# Patient Record
Sex: MALE | Race: WHITE | NOT HISPANIC OR LATINO | Employment: FULL TIME | URBAN - METROPOLITAN AREA
[De-identification: names, ages, dates, MRNs, and addresses within clinical notes are randomized per-mention and may not be internally consistent; named-entity substitution may affect disease eponyms.]

---

## 2020-02-01 ENCOUNTER — HOSPITAL ENCOUNTER (EMERGENCY)
Facility: HOSPITAL | Age: 50
Discharge: HOME | End: 2020-02-01
Attending: EMERGENCY MEDICINE
Payer: COMMERCIAL

## 2020-02-01 VITALS
HEIGHT: 67 IN | TEMPERATURE: 98 F | HEART RATE: 99 BPM | DIASTOLIC BLOOD PRESSURE: 82 MMHG | SYSTOLIC BLOOD PRESSURE: 147 MMHG | WEIGHT: 237.63 LBS | BODY MASS INDEX: 37.3 KG/M2 | OXYGEN SATURATION: 99 % | RESPIRATION RATE: 16 BRPM

## 2020-02-01 DIAGNOSIS — R03.0 ELEVATED BLOOD PRESSURE READING: ICD-10-CM

## 2020-02-01 DIAGNOSIS — N30.90 CYSTITIS: Primary | ICD-10-CM

## 2020-02-01 DIAGNOSIS — R31.9 HEMATURIA, UNSPECIFIED TYPE: ICD-10-CM

## 2020-02-01 LAB
BACTERIA #/AREA URNS HPF: ABNORMAL /HPF
BILIRUB UR QL STRIP: ABNORMAL
CLARITY UR: ABNORMAL
COLOR UR: ABNORMAL
GLUCOSE UR QL STRIP: ABNORMAL
HGB UR QL STRIP: ABNORMAL
KETONES UR QL STRIP: ABNORMAL
LEUKOCYTE ESTERASE UR QL STRIP: ABNORMAL
MICROSCOPIC COMMENT: ABNORMAL
NITRITE UR QL STRIP: ABNORMAL
PH UR STRIP: ABNORMAL [PH] (ref 5–8)
PROT UR QL STRIP: ABNORMAL
RBC #/AREA URNS HPF: >100 /HPF (ref 0–4)
SP GR UR STRIP: ABNORMAL (ref 1–1.03)
URN SPEC COLLECT METH UR: ABNORMAL
UROBILINOGEN UR STRIP-ACNC: ABNORMAL EU/DL
WBC #/AREA URNS HPF: >100 /HPF (ref 0–5)

## 2020-02-01 PROCEDURE — 87077 CULTURE AEROBIC IDENTIFY: CPT

## 2020-02-01 PROCEDURE — 87088 URINE BACTERIA CULTURE: CPT

## 2020-02-01 PROCEDURE — 87186 SC STD MICRODIL/AGAR DIL: CPT

## 2020-02-01 PROCEDURE — 99283 EMERGENCY DEPT VISIT LOW MDM: CPT

## 2020-02-01 PROCEDURE — 87086 URINE CULTURE/COLONY COUNT: CPT

## 2020-02-01 PROCEDURE — 81000 URINALYSIS NONAUTO W/SCOPE: CPT

## 2020-02-01 RX ORDER — SULFAMETHOXAZOLE AND TRIMETHOPRIM 800; 160 MG/1; MG/1
1 TABLET ORAL 2 TIMES DAILY
Qty: 10 TABLET | Refills: 0 | Status: SHIPPED | OUTPATIENT
Start: 2020-02-01 | End: 2020-02-06

## 2020-02-01 RX ORDER — NITROFURANTOIN 25; 75 MG/1; MG/1
100 CAPSULE ORAL
COMMUNITY
End: 2020-02-01

## 2020-02-01 RX ORDER — ASPIRIN 325 MG
325 TABLET ORAL DAILY
COMMUNITY
End: 2022-04-27

## 2020-02-01 NOTE — ED PROVIDER NOTES
SCRIBE #1 NOTE: I, Jose Elias Peguero, am scribing for, and in the presence of, Susan Jacobson PA-C. I have scribed the entire note.       History     Chief Complaint   Patient presents with    Hematuria     currently taking antibiotics for UTI     Review of patient's allergies indicates:   Allergen Reactions    Penicillins          History of Present Illness     HPI    2/1/2020, 4:17 PM   History obtained from the patient      History of Present Illness: Oleksandr Dawkins is a 49 y.o. male patient with a PMHx of heart attack who presents to the Emergency Department for evaluation of hematuria which onset gradually today. Pt has been on macrobid for a UTI for approximately 1 week. Symptoms are constant and moderate in severity. No mitigating or exacerbating factors reported. No associated sxs reported. Patient denies any fever, SOB, CP, abd pain, N/V, dysuria, urinary frequency, back pain, HA, weakness, and all other sxs at this time. No prior Tx reported. No further complaints or concerns at this time.        Arrival mode: Personal vehicle      PCP: Primary Doctor No        Past Medical History:  Past Medical History:   Diagnosis Date    Heart attack        Past Surgical History:  Past Surgical History:   Procedure Laterality Date    CHOLECYSTECTOMY      CORONARY ANGIOPLASTY WITH STENT PLACEMENT      gastric sleeve           Family History:  History reviewed. No pertinent family history.      Social History:  Social History     Tobacco Use    Smoking status: Never Smoker    Smokeless tobacco: Never Used   Substance and Sexual Activity    Alcohol use: Yes     Comment: occ    Drug use: Unknown     Sexual activity: Unknown         Review of Systems     Review of Systems   Constitutional: Negative for fever.   HENT: Negative for sore throat.    Respiratory: Negative for shortness of breath.    Cardiovascular: Negative for chest pain.   Gastrointestinal: Negative for abdominal pain, nausea and vomiting.  "  Genitourinary: Positive for hematuria. Negative for dysuria and frequency.   Musculoskeletal: Negative for back pain.   Skin: Negative for rash.   Neurological: Negative for weakness and headaches.   Hematological: Does not bruise/bleed easily.   All other systems reviewed and are negative.       Physical Exam     Initial Vitals [02/01/20 1603]   BP Pulse Resp Temp SpO2   (!) 147/82 99 16 98.4 °F (36.9 °C) 99 %      MAP       --          Physical Exam  Nursing Notes and Vital Signs Reviewed.  Constitutional: Patient is in no acute distress. Well-developed and well-nourished.  Head: Atraumatic. Normocephalic.  Eyes: PERRL. EOM intact. Conjunctivae are not pale. No scleral icterus.  ENT: Mucous membranes are moist. Oropharynx is clear and symmetric.    Neck: Supple. Full ROM. No lymphadenopathy.  Cardiovascular: Regular rate. Regular rhythm. No murmurs, rubs, or gallops. Distal pulses are 2+ and symmetric.  Pulmonary/Chest: No respiratory distress. Clear to auscultation bilaterally. No wheezing or rales.  Abdominal: Soft and non-distended.  There is no tenderness.  No rebound, guarding, or rigidity. Good bowel sounds.  Genitourinary: No CVA tenderness  Musculoskeletal: Moves all extremities. No obvious deformities. No edema. No calf tenderness.  Skin: Warm and dry.  Neurological:  Alert, awake, and appropriate.  Normal speech.  No acute focal neurological deficits are appreciated.  Psychiatric: Normal affect. Good eye contact. Appropriate in content.     ED Course   Procedures  ED Vital Signs:  Vitals:    02/01/20 1603   BP: (!) 147/82   Pulse: 99   Resp: 16   Temp: 98.4 °F (36.9 °C)   TempSrc: Oral   SpO2: 99%   Weight: 107.8 kg (237 lb 10.5 oz)   Height: 5' 7" (1.702 m)       Abnormal Lab Results:  Labs Reviewed   URINALYSIS, REFLEX TO URINE CULTURE - Abnormal; Notable for the following components:       Result Value    Color, UA Red (*)     Appearance, UA Cloudy (*)     All other components within normal limits "    Narrative:     Preferred Collection Type->Urine, Clean Catch   URINALYSIS MICROSCOPIC - Abnormal; Notable for the following components:    RBC, UA >100 (*)     WBC, UA >100 (*)     Bacteria Few (*)     All other components within normal limits    Narrative:     Preferred Collection Type->Urine, Clean Catch   CULTURE, URINE        All Lab Results:  Results for orders placed or performed during the hospital encounter of 02/01/20   Urinalysis, Reflex to Urine Culture Urine, Clean Catch   Result Value Ref Range    Specimen UA Urine, Clean Catch     Color, UA Red (A) Yellow, Straw, Kristie    Appearance, UA Cloudy (A) Clear    pH, UA SEE COMMENT 5.0 - 8.0    Specific Gravity, UA SEE COMMENT 1.005 - 1.030    Protein, UA SEE COMMENT Negative    Glucose, UA SEE COMMENT Negative    Ketones, UA SEE COMMENT Negative    Bilirubin (UA) SEE COMMENT Negative    Occult Blood UA SEE COMMENT Negative    Nitrite, UA SEE COMMENT Negative    Urobilinogen, UA SEE COMMENT <2.0 EU/dL    Leukocytes, UA SEE COMMENT Negative   Urinalysis Microscopic   Result Value Ref Range    RBC, UA >100 (H) 0 - 4 /hpf    WBC, UA >100 (H) 0 - 5 /hpf    Bacteria Few (A) None-Occ /hpf    Microscopic Comment SEE COMMENT             The Emergency Provider reviewed the vital signs and test results, which are outlined above.     ED Discussion     5:07 PM: Patient denies flank pain, abdominal pain, and other pain symptoms; patient declines imaging to rule out kidney stones.  Reassessed pt at this time.  Pt states his condition has improved at this time. Discussed with pt all pertinent ED information and results. Discussed pt dx and plan of tx. Gave pt all f/u and return to the ED instructions. All questions and concerns were addressed at this time. Pt expresses understanding of information and instructions, and is comfortable with plan to discharge. Pt is stable for discharge.    I discussed with patient and/or family/caretaker that evaluation in the ED does not  suggest any emergent or life threatening medical conditions requiring immediate intervention beyond what was provided in the ED, and I believe patient is safe for discharge.  Regardless, an unremarkable evaluation in the ED does not preclude the development or presence of a serious of life threatening condition. As such, patient was instructed to return immediately for any worsening or change in current symptoms.     ED Medication(s):  Medications - No data to display    Discharge Medication List as of 2/1/2020  5:08 PM      START taking these medications    Details   sulfamethoxazole-trimethoprim 800-160mg (BACTRIM DS) 800-160 mg Tab Take 1 tablet by mouth 2 (two) times daily. for 5 days, Starting Sat 2/1/2020, Until Thu 2/6/2020, Print             Follow-up Information     [facility]. Schedule an appointment as soon as possible for a visit in 3 days.    Specialty:  Internal Medicine  Contact information:  [address] [facility] [id number] [phone]  Additional information:  2nd Floor - From [address], take the [facility] exit ([address] Enter the facility from the [address]           [facility]. Schedule an appointment as soon as possible for a visit in 3 days.    Contact information:  [address] [place] [address]    Phone:  [phone]                       Medical Decision Making:   Clinical Tests:   Lab Tests: Ordered and Reviewed             Scribe Attestation:   Scribe #1: I performed the above scribed service and the documentation accurately describes the services I performed. I attest to the accuracy of the note.     Attending:   Physician Attestation Statement for Scribe #1: I, Susan Jacobson PA-C, personally performed the services described in this documentation, as scribed by Jose Elias Peguero, in my presence, and it is both accurate and complete.           Clinical Impression       ICD-10-CM ICD-9-CM   1. Cystitis N30.90 595.9   2.  Hematuria, unspecified type R31.9 599.70   3. Elevated blood pressure reading R03.0 796.2       Disposition:   Disposition: Discharged  Condition: Stable          Susan Jacobson PA-C  02/01/20 1479

## 2020-02-03 LAB — BACTERIA UR CULT: ABNORMAL

## 2020-02-18 ENCOUNTER — TELEPHONE (OUTPATIENT)
Dept: UROLOGY | Facility: CLINIC | Age: 50
End: 2020-02-18

## 2020-02-18 NOTE — TELEPHONE ENCOUNTER
----- Message from Teresa Guthrie sent at 2/18/2020  9:45 AM CST -----  Contact: pt   Stated he's calling for an ER follow up visit, he can be reached at [phone] Thanks

## 2020-02-18 NOTE — TELEPHONE ENCOUNTER
Returned pt's call, NP needing sooner appt for ER fu for Hematuria. Offered appt in [place] since next availability with Dr. Alexandre is in April. Scheduled pt tomorrow at 1:30 with Dr. Caba & provided him with addres. Pt verbalized understanding.

## 2020-02-19 ENCOUNTER — OFFICE VISIT (OUTPATIENT)
Dept: UROLOGY | Facility: CLINIC | Age: 50
End: 2020-02-19
Payer: COMMERCIAL

## 2020-02-19 VITALS
DIASTOLIC BLOOD PRESSURE: 81 MMHG | SYSTOLIC BLOOD PRESSURE: 116 MMHG | RESPIRATION RATE: 18 BRPM | BODY MASS INDEX: 37.81 KG/M2 | WEIGHT: 241.38 LBS | HEART RATE: 90 BPM

## 2020-02-19 DIAGNOSIS — N30.91 CYSTITIS WITH HEMATURIA: Primary | ICD-10-CM

## 2020-02-19 DIAGNOSIS — N40.0 BPH WITHOUT URINARY OBSTRUCTION: ICD-10-CM

## 2020-02-19 DIAGNOSIS — R31.0 GROSS HEMATURIA: ICD-10-CM

## 2020-02-19 PROCEDURE — 99999 PR PBB SHADOW E&M-EST. PATIENT-LVL III: CPT | Mod: PBBFAC,,, | Performed by: UROLOGY

## 2020-02-19 PROCEDURE — 99204 PR OFFICE/OUTPT VISIT, NEW, LEVL IV, 45-59 MIN: ICD-10-PCS | Mod: S$GLB,,, | Performed by: UROLOGY

## 2020-02-19 PROCEDURE — 99204 OFFICE O/P NEW MOD 45 MIN: CPT | Mod: S$GLB,,, | Performed by: UROLOGY

## 2020-02-19 PROCEDURE — 99999 PR PBB SHADOW E&M-EST. PATIENT-LVL III: ICD-10-PCS | Mod: PBBFAC,,, | Performed by: UROLOGY

## 2020-02-19 RX ORDER — SULFAMETHOXAZOLE AND TRIMETHOPRIM 800; 160 MG/1; MG/1
1 TABLET ORAL 2 TIMES DAILY
Qty: 20 TABLET | Refills: 0 | Status: SHIPPED | OUTPATIENT
Start: 2020-02-19 | End: 2020-02-29

## 2020-02-19 NOTE — LETTER
February 19, 2020      Provider Ochoa Arias  Urology  [address]  [place] [address]  Phone: [phone]  Fax: [phone]          Patient: Oleksandr Dawkins   MR Number: [id number]   YOB: 1970   Date of Visit: 2/19/2020       Dear Provider Ochoa:    Thank you for referring Oleksandr Dawkins to me for evaluation. Attached you will find relevant portions of my assessment and plan of care.    If you have questions, please do not hesitate to call me. I look forward to following Oleksandr Dawkins along with you.    Sincerely,    ASAEL Caba MD    Enclosure  CC:  No Recipients    If you would like to receive this communication electronically, please contact [email or web address] or [phone] to request more information on [facility] access.    For providers and/or their staff who would like to refer a patient to [facility], please contact us through our one-stop-shop provider referral line, [facility] [place], at [phone].    If you feel you have received this communication in error or would no longer like to receive these types of communications, please e-mail [email or web address]

## 2020-02-19 NOTE — PROGRESS NOTES
Subjective:       Patient ID: Oleksandr Dawkins is a 49 y.o. male.    Chief Complaint: Advice Only (pressure in penile area. )    HPI     49-year-old with a history of cloudy urine in a bad urine odor.  He thought he had a urinary tract infection.  He was seen in urgent care and was initially treated with a course of antibiotics.  He can't recall the name although it may have been doxycycline.  He was also having low back pain.  After completing the antibiotics he then began having gross hematuria.  He was immediately alarmed and went to the emergency room.  Urinalysis was consistent with infection.  His culture was positive for E coli in that he was treated with 5 days of Bactrim.  His symptoms have mostly resolved although he still has a pressure sensation down his pelvis.  He denies flank pain and he has no history of stones.  He has never smoked.  He has had no previous urinary tract infections.  He has had no previous episodes hematuria.  He has no family history of prostate cancer.  No previous PSA    Past Medical History:   Diagnosis Date    Heart attack     Hx of vasectomy 2007    MI (myocardial infarction) 2007    1 stent placed      Past Surgical History:   Procedure Laterality Date    CHOLECYSTECTOMY      CORONARY ANGIOPLASTY WITH STENT PLACEMENT      gastric sleeve         Current Outpatient Medications:     aspirin 325 MG tablet, Take 325 mg by mouth once daily., Disp: , Rfl:     sulfamethoxazole-trimethoprim 800-160mg (BACTRIM DS) 800-160 mg Tab, Take 1 tablet by mouth 2 (two) times daily. for 10 days, Disp: 20 tablet, Rfl: 0    Review of Systems   Constitutional: Negative for fever.   Eyes: Negative for visual disturbance.   Respiratory: Negative for shortness of breath.    Cardiovascular: Negative for chest pain.   Gastrointestinal: Negative for nausea.   Genitourinary: Positive for hematuria. Negative for dysuria.   Musculoskeletal: Positive for back pain. Negative for gait problem.   Skin:  Negative for rash.   Neurological: Negative for seizures.   Psychiatric/Behavioral: Negative for confusion.       Objective:      Physical Exam   Constitutional: He is oriented to person, place, and time. He appears well-developed and well-nourished.   HENT:   Head: Normocephalic and atraumatic.   Eyes: Conjunctivae are normal.   Cardiovascular: Normal rate.   Pulmonary/Chest: Effort normal.   Abdominal: Soft. He exhibits no distension. Hernia confirmed negative in the right inguinal area and confirmed negative in the left inguinal area.   Genitourinary: Testes normal and penis normal. Rectal exam shows no mass and anal tone normal. Prostate is enlarged (40g, s/s/a). Prostate is not tender.   Musculoskeletal: Normal range of motion. He exhibits no edema.   Neurological: He is alert and oriented to person, place, and time.   Skin: Skin is warm and dry. No rash noted.   Psychiatric: He has a normal mood and affect.   Vitals reviewed.      Assessment:       1. Cystitis with hematuria    2. Gross hematuria    3. BPH without urinary obstruction        Plan:       Cystitis with hematuria  -     POCT urine dipstick without microscope    Gross hematuria  -     CT Urogram Abd Pelvis W WO; Future; Expected date: 02/19/2020  -     Cystoscopy; Future    BPH without urinary obstruction    Other orders  -     sulfamethoxazole-trimethoprim 800-160mg (BACTRIM DS) 800-160 mg Tab; Take 1 tablet by mouth 2 (two) times daily. for 10 days  Dispense: 20 tablet; Refill: 0      extent Bactrim 10 more days.  Follow up for cystoscopy.

## 2020-02-21 ENCOUNTER — TELEPHONE (OUTPATIENT)
Dept: RADIOLOGY | Facility: HOSPITAL | Age: 50
End: 2020-02-21

## 2020-02-24 ENCOUNTER — HOSPITAL ENCOUNTER (OUTPATIENT)
Dept: RADIOLOGY | Facility: HOSPITAL | Age: 50
Discharge: HOME | End: 2020-02-24
Attending: UROLOGY
Payer: COMMERCIAL

## 2020-02-24 DIAGNOSIS — R31.0 GROSS HEMATURIA: ICD-10-CM

## 2020-02-24 PROCEDURE — 74178 CT UROGRAM ABD PELVIS W WO: ICD-10-PCS | Mod: 26,,, | Performed by: RADIOLOGY

## 2020-02-24 PROCEDURE — 74178 CT ABD&PLV WO CNTR FLWD CNTR: CPT | Mod: 26,,, | Performed by: RADIOLOGY

## 2020-02-24 PROCEDURE — 25500020 PHARM REV CODE 255: Performed by: UROLOGY

## 2020-02-24 PROCEDURE — 74178 CT ABD&PLV WO CNTR FLWD CNTR: CPT | Mod: TC

## 2020-02-24 RX ADMIN — IOHEXOL 125 ML: 350 INJECTION, SOLUTION INTRAVENOUS at 02:02

## 2020-03-03 ENCOUNTER — TELEPHONE (OUTPATIENT)
Dept: UROLOGY | Facility: CLINIC | Age: 50
End: 2020-03-03

## 2020-03-03 NOTE — TELEPHONE ENCOUNTER
----- Message from Marva Lopez sent at 3/3/2020  3:01 PM CST -----  Contact: self  Type:  Patient Returning Call    Who Called:  self  Who Left Message for Patient:  Edie  Does the patient know what this is regarding?:  yes  Best Call Back Number:  [phone] (home)   Additional Information:  Patient states she called yesterday regarding his results. Please call patient. Thanks!

## 2020-03-03 NOTE — TELEPHONE ENCOUNTER
----- Message from Abimael Flores sent at 3/3/2020  2:58 PM CST -----  Contact: Patient  Type:  Patient Returning Call    Who Called:  Patient  Who Left Message for Patient:  Edie?  Does the patient know what this is regarding?:  Test results?  Best Call Back Number:  [phone]  Additional Information:  NA

## 2022-02-01 ENCOUNTER — IMMUNIZATION (OUTPATIENT)
Dept: PRIMARY CARE CLINIC | Facility: CLINIC | Age: 52
End: 2022-02-01
Payer: COMMERCIAL

## 2022-02-01 DIAGNOSIS — Z23 NEED FOR VACCINATION: Primary | ICD-10-CM

## 2022-02-01 PROCEDURE — 91300 COVID-19, MRNA, LNP-S, PF, 30 MCG/0.3 ML DOSE VACCINE: CPT | Mod: PBBFAC | Performed by: INTERNAL MEDICINE

## 2022-04-27 ENCOUNTER — OFFICE VISIT (OUTPATIENT)
Dept: CARDIOLOGY | Facility: CLINIC | Age: 52
End: 2022-04-27
Payer: COMMERCIAL

## 2022-04-27 VITALS
WEIGHT: 257.19 LBS | HEIGHT: 67 IN | BODY MASS INDEX: 40.37 KG/M2 | SYSTOLIC BLOOD PRESSURE: 117 MMHG | HEART RATE: 80 BPM | DIASTOLIC BLOOD PRESSURE: 81 MMHG

## 2022-04-27 DIAGNOSIS — Z90.3 S/P GASTRIC SLEEVE PROCEDURE: ICD-10-CM

## 2022-04-27 DIAGNOSIS — E88.810 METABOLIC SYNDROME: ICD-10-CM

## 2022-04-27 DIAGNOSIS — L40.9 PSORIASIS: ICD-10-CM

## 2022-04-27 DIAGNOSIS — I25.2 OLD MYOCARDIAL INFARCTION: ICD-10-CM

## 2022-04-27 DIAGNOSIS — E66.01 MORBID OBESITY: ICD-10-CM

## 2022-04-27 DIAGNOSIS — E78.5 DYSLIPIDEMIA: ICD-10-CM

## 2022-04-27 DIAGNOSIS — I25.10 CORONARY ARTERY DISEASE INVOLVING NATIVE CORONARY ARTERY OF NATIVE HEART WITHOUT ANGINA PECTORIS: Primary | ICD-10-CM

## 2022-04-27 PROCEDURE — 99999 PR PBB SHADOW E&M-EST. PATIENT-LVL III: ICD-10-PCS | Mod: PBBFAC,,, | Performed by: INTERNAL MEDICINE

## 2022-04-27 PROCEDURE — 1160F RVW MEDS BY RX/DR IN RCRD: CPT | Mod: CPTII,S$GLB,, | Performed by: INTERNAL MEDICINE

## 2022-04-27 PROCEDURE — 99999 PR PBB SHADOW E&M-EST. PATIENT-LVL III: CPT | Mod: PBBFAC,,, | Performed by: INTERNAL MEDICINE

## 2022-04-27 PROCEDURE — 93005 EKG 12-LEAD: ICD-10-PCS | Mod: S$GLB,,, | Performed by: INTERNAL MEDICINE

## 2022-04-27 PROCEDURE — 1160F PR REVIEW ALL MEDS BY PRESCRIBER/CLIN PHARMACIST DOCUMENTED: ICD-10-PCS | Mod: CPTII,S$GLB,, | Performed by: INTERNAL MEDICINE

## 2022-04-27 PROCEDURE — 3074F PR MOST RECENT SYSTOLIC BLOOD PRESSURE < 130 MM HG: ICD-10-PCS | Mod: CPTII,S$GLB,, | Performed by: INTERNAL MEDICINE

## 2022-04-27 PROCEDURE — 1159F PR MEDICATION LIST DOCUMENTED IN MEDICAL RECORD: ICD-10-PCS | Mod: CPTII,S$GLB,, | Performed by: INTERNAL MEDICINE

## 2022-04-27 PROCEDURE — 3079F DIAST BP 80-89 MM HG: CPT | Mod: CPTII,S$GLB,, | Performed by: INTERNAL MEDICINE

## 2022-04-27 PROCEDURE — 3008F PR BODY MASS INDEX (BMI) DOCUMENTED: ICD-10-PCS | Mod: CPTII,S$GLB,, | Performed by: INTERNAL MEDICINE

## 2022-04-27 PROCEDURE — 3074F SYST BP LT 130 MM HG: CPT | Mod: CPTII,S$GLB,, | Performed by: INTERNAL MEDICINE

## 2022-04-27 PROCEDURE — 3044F PR MOST RECENT HEMOGLOBIN A1C LEVEL <7.0%: ICD-10-PCS | Mod: CPTII,S$GLB,, | Performed by: INTERNAL MEDICINE

## 2022-04-27 PROCEDURE — 1159F MED LIST DOCD IN RCRD: CPT | Mod: CPTII,S$GLB,, | Performed by: INTERNAL MEDICINE

## 2022-04-27 PROCEDURE — 93005 ELECTROCARDIOGRAM TRACING: CPT | Mod: S$GLB,,, | Performed by: INTERNAL MEDICINE

## 2022-04-27 PROCEDURE — 99205 PR OFFICE/OUTPT VISIT, NEW, LEVL V, 60-74 MIN: ICD-10-PCS | Mod: S$GLB,,, | Performed by: INTERNAL MEDICINE

## 2022-04-27 PROCEDURE — 3079F PR MOST RECENT DIASTOLIC BLOOD PRESSURE 80-89 MM HG: ICD-10-PCS | Mod: CPTII,S$GLB,, | Performed by: INTERNAL MEDICINE

## 2022-04-27 PROCEDURE — 3008F BODY MASS INDEX DOCD: CPT | Mod: CPTII,S$GLB,, | Performed by: INTERNAL MEDICINE

## 2022-04-27 PROCEDURE — 93010 EKG 12-LEAD: ICD-10-PCS | Mod: S$GLB,,, | Performed by: INTERNAL MEDICINE

## 2022-04-27 PROCEDURE — 93010 ELECTROCARDIOGRAM REPORT: CPT | Mod: S$GLB,,, | Performed by: INTERNAL MEDICINE

## 2022-04-27 PROCEDURE — 99205 OFFICE O/P NEW HI 60 MIN: CPT | Mod: S$GLB,,, | Performed by: INTERNAL MEDICINE

## 2022-04-27 PROCEDURE — 3044F HG A1C LEVEL LT 7.0%: CPT | Mod: CPTII,S$GLB,, | Performed by: INTERNAL MEDICINE

## 2022-04-27 RX ORDER — ASPIRIN 81 MG/1
81 TABLET ORAL DAILY
COMMUNITY

## 2022-04-27 RX ORDER — ROSUVASTATIN CALCIUM 20 MG/1
20 TABLET, COATED ORAL DAILY
COMMUNITY
End: 2022-04-27 | Stop reason: SDUPTHER

## 2022-04-27 RX ORDER — USTEKINUMAB 90 MG/ML
INJECTION, SOLUTION SUBCUTANEOUS
COMMUNITY
Start: 2022-03-24 | End: 2023-04-21

## 2022-04-27 RX ORDER — ROSUVASTATIN CALCIUM 20 MG/1
20 TABLET, COATED ORAL DAILY
Qty: 30 TABLET | Refills: 6 | Status: SHIPPED | OUTPATIENT
Start: 2022-04-27 | End: 2022-10-20

## 2022-04-28 ENCOUNTER — HOSPITAL ENCOUNTER (OUTPATIENT)
Dept: CARDIOLOGY | Facility: HOSPITAL | Age: 52
Discharge: HOME | End: 2022-04-28
Attending: INTERNAL MEDICINE
Payer: COMMERCIAL

## 2022-04-28 VITALS
HEART RATE: 72 BPM | DIASTOLIC BLOOD PRESSURE: 80 MMHG | HEIGHT: 67 IN | WEIGHT: 257 LBS | BODY MASS INDEX: 40.34 KG/M2 | SYSTOLIC BLOOD PRESSURE: 120 MMHG

## 2022-04-28 DIAGNOSIS — E78.5 DYSLIPIDEMIA: ICD-10-CM

## 2022-04-28 DIAGNOSIS — E66.01 MORBID OBESITY: ICD-10-CM

## 2022-04-28 DIAGNOSIS — L40.9 PSORIASIS: ICD-10-CM

## 2022-04-28 DIAGNOSIS — E88.810 METABOLIC SYNDROME: ICD-10-CM

## 2022-04-28 DIAGNOSIS — Z90.3 S/P GASTRIC SLEEVE PROCEDURE: ICD-10-CM

## 2022-04-28 PROBLEM — I25.2 OLD MYOCARDIAL INFARCTION: Status: ACTIVE | Noted: 2022-04-28

## 2022-04-28 LAB
ASCENDING AORTA: 3.04 CM
AV INDEX (PROSTH): 0.68
AV MEAN GRADIENT: 4 MMHG
AV PEAK GRADIENT: 8 MMHG
AV VALVE AREA: 2.16 CM2
AV VELOCITY RATIO: 0.71
BSA FOR ECHO PROCEDURE: 2.35 M2
CV ECHO LV RWT: 0.33 CM
DOP CALC AO PEAK VEL: 1.37 M/S
DOP CALC AO VTI: 29.75 CM
DOP CALC LVOT AREA: 3.2 CM2
DOP CALC LVOT DIAMETER: 2.01 CM
DOP CALC LVOT PEAK VEL: 0.97 M/S
DOP CALC LVOT STROKE VOLUME: 64.38 CM3
DOP CALC RVOT PEAK VEL: 0.44 M/S
DOP CALC RVOT VTI: 11.33 CM
DOP CALCLVOT PEAK VEL VTI: 20.3 CM
E WAVE DECELERATION TIME: 185.4 MSEC
E/A RATIO: 1.27
E/E' RATIO: 13.64 M/S
ECHO LV POSTERIOR WALL: 0.85 CM (ref 0.6–1.1)
EJECTION FRACTION: 48 %
FRACTIONAL SHORTENING: 31 % (ref 28–44)
INTERVENTRICULAR SEPTUM: 0.84 CM (ref 0.6–1.1)
IVRT: 85.63 MSEC
LA MAJOR: 4.49 CM
LA MINOR: 4.59 CM
LA WIDTH: 2.74 CM
LEFT ATRIUM SIZE: 3.55 CM
LEFT ATRIUM VOLUME INDEX MOD: 15 ML/M2
LEFT ATRIUM VOLUME INDEX: 16.7 ML/M2
LEFT ATRIUM VOLUME MOD: 33.81 CM3
LEFT ATRIUM VOLUME: 37.53 CM3
LEFT INTERNAL DIMENSION IN SYSTOLE: 3.6 CM (ref 2.1–4)
LEFT VENTRICLE DIASTOLIC VOLUME INDEX: 45.61 ML/M2
LEFT VENTRICLE DIASTOLIC VOLUME: 102.63 ML
LEFT VENTRICLE MASS INDEX: 69 G/M2
LEFT VENTRICLE SYSTOLIC VOLUME INDEX: 20.2 ML/M2
LEFT VENTRICLE SYSTOLIC VOLUME: 45.53 ML
LEFT VENTRICULAR INTERNAL DIMENSION IN DIASTOLE: 5.2 CM (ref 3.5–6)
LEFT VENTRICULAR MASS: 155.75 G
LV LATERAL E/E' RATIO: 10.71 M/S
LV SEPTAL E/E' RATIO: 18.75 M/S
MV A" WAVE DURATION": 9.71 MSEC
MV PEAK A VEL: 0.59 M/S
MV PEAK E VEL: 0.75 M/S
MV STENOSIS PRESSURE HALF TIME: 53.77 MS
MV VALVE AREA P 1/2 METHOD: 4.09 CM2
PISA TR MAX VEL: 2.54 M/S
PULM VEIN S/D RATIO: 1.28
PV MEAN GRADIENT: 0.46 MMHG
PV PEAK D VEL: 0.32 M/S
PV PEAK S VEL: 0.41 M/S
PV PEAK VELOCITY: 0.68 CM/S
RA MAJOR: 4.12 CM
RA PRESSURE: 3 MMHG
RA WIDTH: 3.05 CM
SINUS: 2.92 CM
STJ: 2.76 CM
TDI LATERAL: 0.07 M/S
TDI SEPTAL: 0.04 M/S
TDI: 0.06 M/S
TR MAX PG: 26 MMHG
TRICUSPID ANNULAR PLANE SYSTOLIC EXCURSION: 2.02 CM
TV REST PULMONARY ARTERY PRESSURE: 29 MMHG

## 2022-04-28 PROCEDURE — C8929 TTE W OR WO FOL WCON,DOPPLER: HCPCS

## 2022-04-28 PROCEDURE — 93306 TTE W/DOPPLER COMPLETE: CPT | Mod: 26,,, | Performed by: INTERNAL MEDICINE

## 2022-04-28 PROCEDURE — 25500020 PHARM REV CODE 255: Performed by: INTERNAL MEDICINE

## 2022-04-28 PROCEDURE — 93306 ECHO (CUPID ONLY): ICD-10-PCS | Mod: 26,,, | Performed by: INTERNAL MEDICINE

## 2022-04-28 RX ADMIN — HUMAN ALBUMIN MICROSPHERES AND PERFLUTREN 0.11 MG: 10; .22 INJECTION, SOLUTION INTRAVENOUS at 10:04

## 2022-04-28 NOTE — NURSING NOTE
Administered [employer] contrast to assist with Echo. Allergies verified. NAD noted. 22g IV flushed and d/c'd.

## 2022-04-28 NOTE — PROGRESS NOTES
Subjective:   Patient ID:  Oleksandr Dawkins is a 51 y.o. male who presents for evaulation of Hyperlipidemia      HPI: Very pleasant male presents to establish care. In the past he was followed by cardiology in [place] and [place].  He reports bi-annual stress tests since his MI in 2012 until about 3 years ago.  In 2012 he was working as a  and after particularly long work day developed chest pain, diaphoresis and nausea.  Was brought to ER and diagnosed with Acute MI. Stent was placed within 20 minutes of arrival. He capps snot recall the artery, but by ECG it appears that he had inferior wall MI.  Patient was initially on Lisinopril, Metoprolol, Plavix and ASA.  Also started on 40 mg Lipitor, but was not able to tolerate that dose. Finally too 10 mh of Lipitor for a long time but eventually discontinued all of his meds including ASA.    IN 2012 patient weight was over 300 lbs. In 2015 he had gastric sleeve surgery and lost >100 lbs. Slowly his weight increased.  After losing and gaining few pounds over the past 7 years today he weighs 225 lbs. Patient used to bicycle, but now is quite sedentary.  He is asymptomatic.  He has history of psoriasis and takes Stelara for it.  He has EZEQUIEL, but is not using C-PAP.    Today lipids are not at goal.    ECG NSR, Old IMI    Past Medical History:   Diagnosis Date    Heart attack     Hx of vasectomy 2007    MI (myocardial infarction) 2007    1 stent placed        Past Surgical History:   Procedure Laterality Date    CHOLECYSTECTOMY      CORONARY ANGIOPLASTY WITH STENT PLACEMENT      gastric sleeve         Social History     Socioeconomic History    Marital status:    Tobacco Use    Smoking status: Never Smoker    Smokeless tobacco: Never Used   Substance and Sexual Activity    Alcohol use: Yes     Comment: occ       Review of patient's allergies indicates:   Allergen Reactions    Penicillins        Lab Results   Component Value Date     04/28/2022    K  "4.0 04/28/2022     04/28/2022    CO2 23 04/28/2022    BUN 14 04/28/2022    CREATININE 0.8 04/28/2022    GLU 88 04/28/2022    HGBA1C 5.6 04/28/2022    AST 21 04/28/2022    ALT 27 04/28/2022    ALBUMIN 3.9 04/28/2022    PROT 6.9 04/28/2022    BILITOT 0.9 04/28/2022    TSH 2.520 04/28/2022    CHOL 215 (H) 04/28/2022    HDL 34 (L) 04/28/2022    LDLCALC 143.6 04/28/2022    TRIG 187 (H) 04/28/2022       Review of Systems   Constitutional: Positive for weight gain.   HENT: Negative.    Eyes: Negative.    Cardiovascular: Negative.  Negative for chest pain, claudication, dyspnea on exertion, irregular heartbeat, leg swelling, near-syncope, palpitations and syncope.   Respiratory: Negative.  Negative for cough, shortness of breath, snoring and wheezing.    Endocrine: Negative.  Negative for cold intolerance, heat intolerance, polydipsia, polyphagia and polyuria.   Skin: Negative.    Musculoskeletal: Positive for arthritis and back pain.   Gastrointestinal: Negative.    Genitourinary: Negative.    Neurological: Negative.    Psychiatric/Behavioral: Negative.        Objective:   Physical Exam  Vitals and nursing note reviewed.   Constitutional:       Appearance: He is well-developed. He is obese.      Comments: /81   Pulse 80   Ht 5' 7" (1.702 m)   Wt 116.7 kg (257 lb 2.7 oz)   BMI 40.28 kg/m²      HENT:      Head: Normocephalic.   Eyes:      Pupils: Pupils are equal, round, and reactive to light.   Neck:      Thyroid: No thyromegaly.      Vascular: No carotid bruit.   Cardiovascular:      Rate and Rhythm: Normal rate and regular rhythm.      Pulses: Intact distal pulses.           Carotid pulses are 2+ on the right side and 2+ on the left side.       Radial pulses are 2+ on the right side and 2+ on the left side.        Femoral pulses are 2+ on the right side and 2+ on the left side.       Popliteal pulses are 2+ on the right side and 2+ on the left side.        Dorsalis pedis pulses are 2+ on the right side and " 2+ on the left side.        Posterior tibial pulses are 2+ on the right side and 2+ on the left side.      Heart sounds: Normal heart sounds. No murmur heard.    No friction rub. No gallop.   Pulmonary:      Effort: Pulmonary effort is normal. No respiratory distress.      Breath sounds: Normal breath sounds. No wheezing or rales.   Chest:      Chest wall: No tenderness.   Abdominal:      General: Bowel sounds are normal.      Palpations: Abdomen is soft.   Musculoskeletal:         General: Normal range of motion.      Cervical back: Normal range of motion and neck supple.      Comments: varicose veins.   Skin:     General: Skin is warm and dry.   Neurological:      Mental Status: He is alert and oriented to person, place, and time.         Current Outpatient Medications   Medication Sig    STELARA 90 mg/mL Syrg syringe Inject into the skin every 3 (three) months.    aspirin (ECOTRIN) 81 MG EC tablet Take 81 mg by mouth once daily.    rosuvastatin (CRESTOR) 20 MG tablet Take 1 tablet (20 mg total) by mouth once daily.     No current facility-administered medications for this visit.       Assessment and Plan:     Problem List Items Addressed This Visit        Cardiology Problems    Coronary artery disease involving native coronary artery without angina pectoris - Primary    Relevant Orders    Lipid Panel    Hepatic Function Panel    Old myocardial infarction    Relevant Orders    Lipid Panel    Hepatic Function Panel       Other    Dyslipidemia    Relevant Medications    rosuvastatin (CRESTOR) 20 MG tablet    Other Relevant Orders    IN OFFICE EKG 12-LEAD (to [facility]) (Completed)    Echo    Lipid Panel (Completed)    TSH (Completed)    Comprehensive Metabolic Panel (Completed)    Hemoglobin A1C (Completed)    Lipid Panel    Hepatic Function Panel    Morbid obesity    Relevant Medications    rosuvastatin (CRESTOR) 20 MG tablet    Other Relevant Orders    IN OFFICE EKG 12-LEAD (to [facility]) (Completed)    Echo    Lipid  Panel (Completed)    TSH (Completed)    Comprehensive Metabolic Panel (Completed)    Hemoglobin A1C (Completed)    Lipid Panel    Hepatic Function Panel    S/P gastric sleeve procedure    Relevant Medications    rosuvastatin (CRESTOR) 20 MG tablet    Other Relevant Orders    IN OFFICE EKG 12-LEAD (to [employer]) (Completed)    Echo    Lipid Panel (Completed)    TSH (Completed)    Comprehensive Metabolic Panel (Completed)    Hemoglobin A1C (Completed)    Lipid Panel    Hepatic Function Panel    Metabolic syndrome    Relevant Medications    rosuvastatin (CRESTOR) 20 MG tablet    Other Relevant Orders    IN OFFICE EKG 12-LEAD (to [employer]) (Completed)    Echo    Lipid Panel (Completed)    TSH (Completed)    Comprehensive Metabolic Panel (Completed)    Hemoglobin A1C (Completed)    Lipid Panel    Hepatic Function Panel    Psoriasis    Relevant Medications    rosuvastatin (CRESTOR) 20 MG tablet    Other Relevant Orders    IN OFFICE EKG 12-LEAD (to [employer]) (Completed)    Echo    Lipid Panel (Completed)    TSH (Completed)    Comprehensive Metabolic Panel (Completed)    Hemoglobin A1C (Completed)    Lipid Panel    Hepatic Function Panel          Patient's Medications   New Prescriptions    No medications on file   Previous Medications    ASPIRIN (ECOTRIN) 81 MG EC TABLET    Take 81 mg by mouth once daily.    STELARA 90 MG/ML SYRG SYRINGE    Inject into the skin every 3 (three) months.   Modified Medications    Modified Medication Previous Medication    ROSUVASTATIN (CRESTOR) 20 MG TABLET rosuvastatin (CRESTOR) 20 MG tablet       Take 1 tablet (20 mg total) by mouth once daily.    Take 20 mg by mouth once daily.   Discontinued Medications    ASPIRIN 325 MG TABLET    Take 325 mg by mouth once daily.     45 minutes face to face time with counseling more than 50% of the appointment time was spent discussing secondary prevention, weight loss, exercise and overall life style modifications.  Start ASA 81 mg daily.  Crestor 20 mg daily and  repeat blood work in 2 months.  Obtain records and review. If the last stress test was within a reasonable amount of time and it was negative for ischemia then we can postpone one till next year.  CFD to assess LV systolic and diastolic function.  To establish with PCP with age appropriate screening tests and follow up with sleep center.  .  Follow up in about 6 months (around 10/27/2022).

## 2022-04-29 ENCOUNTER — PATIENT MESSAGE (OUTPATIENT)
Dept: CARDIOLOGY | Facility: CLINIC | Age: 52
End: 2022-04-29
Payer: COMMERCIAL

## 2022-04-29 ENCOUNTER — TELEPHONE (OUTPATIENT)
Dept: CARDIOLOGY | Facility: CLINIC | Age: 52
End: 2022-04-29
Payer: COMMERCIAL

## 2022-04-29 DIAGNOSIS — I25.10 CORONARY ARTERY DISEASE INVOLVING NATIVE CORONARY ARTERY OF NATIVE HEART WITHOUT ANGINA PECTORIS: Primary | ICD-10-CM

## 2022-04-29 DIAGNOSIS — E78.5 DYSLIPIDEMIA: ICD-10-CM

## 2022-04-29 RX ORDER — VALSARTAN 40 MG/1
40 TABLET ORAL DAILY
Qty: 30 TABLET | Refills: 6 | Status: SHIPPED | OUTPATIENT
Start: 2022-04-29 | End: 2022-10-25

## 2022-04-29 RX ORDER — VALSARTAN 40 MG/1
40 TABLET ORAL DAILY
COMMUNITY
End: 2022-04-29 | Stop reason: SDUPTHER

## 2022-04-29 NOTE — PROGRESS NOTES
Please inform the patient that his cardiac echo showed slightly depressed heart function ( normal starts at 53% and his is 45-50%).  I would recommend Valsartan 40 mg daily. Since his BP is low normal I would start with half a dose ( 20mg) and monitor BP. If it is OK ( >100 syst) then would increase it to full dose.     I a still awaiting records and will make further recommendations after that.

## 2022-04-29 NOTE — TELEPHONE ENCOUNTER
----- Message from Janet Mascorro MD sent at 4/29/2022 10:23 AM CDT -----  Please inform the patient that his cardiac echo showed slightly depressed heart function ( normal starts at 53% and his is 45-50%).  I would recommend Valsartan 40 mg daily. Since his BP is low normal I would start with half a dose ( 20mg) and monitor BP. If it is OK ( >100 syst) then would increase it to full dose.     I a still awaiting records and will make further recommendations after that.

## 2022-05-04 ENCOUNTER — TELEPHONE (OUTPATIENT)
Dept: CARDIOLOGY | Facility: CLINIC | Age: 52
End: 2022-05-04
Payer: COMMERCIAL

## 2022-05-04 NOTE — TELEPHONE ENCOUNTER
Please review records from [facility] (Dr. Morton).

## 2022-05-25 ENCOUNTER — OFFICE VISIT (OUTPATIENT)
Dept: PODIATRY | Facility: CLINIC | Age: 52
End: 2022-05-25
Payer: COMMERCIAL

## 2022-05-25 ENCOUNTER — HOSPITAL ENCOUNTER (OUTPATIENT)
Dept: RADIOLOGY | Facility: HOSPITAL | Age: 52
Discharge: HOME | End: 2022-05-25
Attending: PODIATRIST
Payer: COMMERCIAL

## 2022-05-25 VITALS
HEART RATE: 73 BPM | DIASTOLIC BLOOD PRESSURE: 70 MMHG | WEIGHT: 263 LBS | HEIGHT: 67 IN | BODY MASS INDEX: 41.28 KG/M2 | SYSTOLIC BLOOD PRESSURE: 106 MMHG

## 2022-05-25 DIAGNOSIS — M72.2 PLANTAR FASCIITIS: Primary | ICD-10-CM

## 2022-05-25 DIAGNOSIS — M79.671 HEEL PAIN, CHRONIC, RIGHT: ICD-10-CM

## 2022-05-25 DIAGNOSIS — M72.2 PLANTAR FASCIITIS: ICD-10-CM

## 2022-05-25 DIAGNOSIS — G89.29 HEEL PAIN, CHRONIC, RIGHT: ICD-10-CM

## 2022-05-25 DIAGNOSIS — L60.9 DISEASE OF NAIL: ICD-10-CM

## 2022-05-25 PROCEDURE — 73630 X-RAY EXAM OF FOOT: CPT | Mod: TC,PN,RT

## 2022-05-25 PROCEDURE — 3044F HG A1C LEVEL LT 7.0%: CPT | Mod: CPTII,S$GLB,, | Performed by: PODIATRIST

## 2022-05-25 PROCEDURE — 3078F PR MOST RECENT DIASTOLIC BLOOD PRESSURE < 80 MM HG: ICD-10-PCS | Mod: CPTII,S$GLB,, | Performed by: PODIATRIST

## 2022-05-25 PROCEDURE — 3074F SYST BP LT 130 MM HG: CPT | Mod: CPTII,S$GLB,, | Performed by: PODIATRIST

## 2022-05-25 PROCEDURE — 99203 OFFICE O/P NEW LOW 30 MIN: CPT | Mod: S$GLB,,, | Performed by: PODIATRIST

## 2022-05-25 PROCEDURE — 3008F BODY MASS INDEX DOCD: CPT | Mod: CPTII,S$GLB,, | Performed by: PODIATRIST

## 2022-05-25 PROCEDURE — 3008F PR BODY MASS INDEX (BMI) DOCUMENTED: ICD-10-PCS | Mod: CPTII,S$GLB,, | Performed by: PODIATRIST

## 2022-05-25 PROCEDURE — 1159F MED LIST DOCD IN RCRD: CPT | Mod: CPTII,S$GLB,, | Performed by: PODIATRIST

## 2022-05-25 PROCEDURE — 99999 PR PBB SHADOW E&M-EST. PATIENT-LVL III: CPT | Mod: PBBFAC,,, | Performed by: PODIATRIST

## 2022-05-25 PROCEDURE — 3074F PR MOST RECENT SYSTOLIC BLOOD PRESSURE < 130 MM HG: ICD-10-PCS | Mod: CPTII,S$GLB,, | Performed by: PODIATRIST

## 2022-05-25 PROCEDURE — 99203 PR OFFICE/OUTPT VISIT, NEW, LEVL III, 30-44 MIN: ICD-10-PCS | Mod: S$GLB,,, | Performed by: PODIATRIST

## 2022-05-25 PROCEDURE — 4010F ACE/ARB THERAPY RXD/TAKEN: CPT | Mod: CPTII,S$GLB,, | Performed by: PODIATRIST

## 2022-05-25 PROCEDURE — 1159F PR MEDICATION LIST DOCUMENTED IN MEDICAL RECORD: ICD-10-PCS | Mod: CPTII,S$GLB,, | Performed by: PODIATRIST

## 2022-05-25 PROCEDURE — 3044F PR MOST RECENT HEMOGLOBIN A1C LEVEL <7.0%: ICD-10-PCS | Mod: CPTII,S$GLB,, | Performed by: PODIATRIST

## 2022-05-25 PROCEDURE — 3078F DIAST BP <80 MM HG: CPT | Mod: CPTII,S$GLB,, | Performed by: PODIATRIST

## 2022-05-25 PROCEDURE — 4010F PR ACE/ARB THEARPY RXD/TAKEN: ICD-10-PCS | Mod: CPTII,S$GLB,, | Performed by: PODIATRIST

## 2022-05-25 PROCEDURE — 99999 PR PBB SHADOW E&M-EST. PATIENT-LVL III: ICD-10-PCS | Mod: PBBFAC,,, | Performed by: PODIATRIST

## 2022-05-25 PROCEDURE — 73630 XR FOOT COMPLETE 3 VIEW RIGHT: ICD-10-PCS | Mod: 26,RT,, | Performed by: RADIOLOGY

## 2022-05-25 PROCEDURE — 73630 X-RAY EXAM OF FOOT: CPT | Mod: 26,RT,, | Performed by: RADIOLOGY

## 2022-05-25 RX ORDER — METHYLPREDNISOLONE 4 MG/1
TABLET ORAL
Qty: 1 EACH | Refills: 0 | Status: SHIPPED | OUTPATIENT
Start: 2022-05-25 | End: 2023-04-21

## 2022-05-25 NOTE — PROGRESS NOTES
Subjective:      Patient ID: Oleksandr Dawkins is a 51 y.o. male.    Chief Complaint:   Plantar Fasciitis (4th toe fungus right foot)    Oleksandr is a 51 y.o. male who presents to the podiatry clinic  with complaint of  right foot pain. Onset of the symptoms was several months ago.  About Six months. Precipitating event: none known. Current symptoms include: ability to bear weight, but with some pain. Aggravating factors: any weight bearing. Symptoms have waxed and waned but are worse overall. Patient has had prior foot problems.  Patient relates he had plantar fasciitis approximately 20 years ago.  He was given inserts and stretches in a night splint.  He does not have the inserts currently but he has been using the night splint however has difficulty sleeping in it.    He has been wearing proper shoes.  Including [place] shoes.  He occasionally takes Motrin which helps.  Tylenol does not help.  He wear safety shoes to work.  He tries not to walk barefoot but does occasionally in his home    Patient also discusses a right 4th toenail discoloration.  He thinks it is fungus.  No treatment.  Inquiring what can be done.  Denies any injury.    Patient relates that does have psoriasis and some psoriatic arthritis in his hands.       Past Medical History:   Diagnosis Date    Heart attack     Hx of vasectomy 2007    MI (myocardial infarction) 2007    1 stent placed      Past Surgical History:   Procedure Laterality Date    CHOLECYSTECTOMY      CORONARY ANGIOPLASTY WITH STENT PLACEMENT      gastric sleeve       Current Outpatient Medications on File Prior to Visit   Medication Sig Dispense Refill    aspirin (ECOTRIN) 81 MG EC tablet Take 81 mg by mouth once daily.      rosuvastatin (CRESTOR) 20 MG tablet Take 1 tablet (20 mg total) by mouth once daily. 30 tablet 6    STELARA 90 mg/mL Syrg syringe Inject into the skin every 3 (three) months.      valsartan (DIOVAN) 40 MG tablet Take 1 tablet (40 mg total) by mouth once  "daily. 30 tablet 6     No current facility-administered medications on file prior to visit.     Review of patient's allergies indicates:   Allergen Reactions    Penicillins        Review of Systems   Constitutional: Negative for chills, decreased appetite, fever, malaise/fatigue, night sweats, weight gain and weight loss.   Cardiovascular: Negative for chest pain, claudication, dyspnea on exertion, leg swelling, palpitations and syncope.   Respiratory: Negative for cough and shortness of breath.    Endocrine: Negative for cold intolerance and heat intolerance.   Hematologic/Lymphatic: Negative for bleeding problem. Does not bruise/bleed easily.   Skin: Positive for nail changes. Negative for color change, dry skin, flushing, itching, poor wound healing, rash, skin cancer, suspicious lesions and unusual hair distribution.   Musculoskeletal: Positive for joint pain (History of knee pain). Negative for arthritis, back pain, falls, gout, joint swelling, muscle cramps, muscle weakness, myalgias, neck pain and stiffness.        Foot pain   Gastrointestinal: Negative for diarrhea, nausea and vomiting.   Neurological: Negative for dizziness, focal weakness, light-headedness, numbness, paresthesias, tremors, vertigo and weakness.   Psychiatric/Behavioral: Negative for altered mental status and depression. The patient does not have insomnia.    Allergic/Immunologic: Negative.            Objective:       Vitals:    05/25/22 0925   BP: 106/70   Pulse: 73   Weight: 119.3 kg (263 lb 0.1 oz)   Height: 5' 7" (1.702 m)   PainSc: 0-No pain   119.3 kg (263 lb 0.1 oz)     Physical Exam  Vitals reviewed.   Constitutional:       General: He is not in acute distress.     Appearance: He is well-developed. He is not ill-appearing, toxic-appearing or diaphoretic.      Comments: Proper supportive shoegear      Cardiovascular:      Pulses:           Dorsalis pedis pulses are 2+ on the right side and 2+ on the left side.        Posterior " tibial pulses are 2+ on the right side and 2+ on the left side.   Musculoskeletal:         General: No swelling.      Right lower leg: No edema.      Left lower leg: No edema.      Right ankle: Normal. No swelling.      Right Achilles Tendon: Normal. No tenderness.      Left ankle: Normal. No swelling.      Left Achilles Tendon: Normal. No tenderness.      Right foot: Decreased range of motion. Deformity, prominent metatarsal heads and tenderness present. No bony tenderness.      Left foot: Decreased range of motion. Deformity and prominent metatarsal heads present. No tenderness or bony tenderness.      Comments: Sharp deep pain to palpation inferior heel right at medial calcaneal tubercle without ecchymosis, erythema, edema, or cardinal signs infection, and no signs of trauma.  Mild pain with heel raise single right    No pain ankle or Achilles    Bilateral heel varus foot type with stance semi reducible  Flexible forefoot   Feet:      Right foot:      Protective Sensation: 10 sites tested. 10 sites sensed.      Skin integrity: No ulcer, blister, skin breakdown, erythema, warmth, callus or dry skin.      Toenail Condition: Right toenails are abnormally thick.      Left foot:      Protective Sensation: 10 sites tested. 10 sites sensed.      Skin integrity: No ulcer, blister, skin breakdown, erythema, warmth, callus or dry skin.      Toenail Condition: Left toenails are normal.      Comments: Right 4th digit nail thickened discolored dystrophic possibly fungal other nails are clear  No pitting    No skin patches skin is clear  Skin:     General: Skin is warm.      Capillary Refill: Capillary refill takes 2 to 3 seconds.      Coloration: Skin is not pale.      Findings: No erythema or rash.      Nails: There is no clubbing.   Neurological:      Mental Status: He is alert and oriented to person, place, and time.      Sensory: No sensory deficit.      Gait: Gait is intact. Gait normal.   Psychiatric:         Attention  and Perception: Attention normal.         Mood and Affect: Mood normal.         Speech: Speech normal.         Behavior: Behavior normal.         Thought Content: Thought content normal.         Cognition and Memory: Cognition normal.         Judgment: Judgment normal.               Assessment:       Encounter Diagnoses   Name Primary?    Plantar fasciitis Yes    Disease of nail     Heel pain, chronic, right          Plan:       Oleksandr was seen today for plantar fasciitis.    Diagnoses and all orders for this visit:    Plantar fasciitis  -     X-Ray Foot Complete Right; Future  -     Ambulatory referral/consult to Physical/Occupational Therapy; Future    Disease of nail    Heel pain, chronic, right  -     X-Ray Foot Complete Right; Future  -     Ambulatory referral/consult to Physical/Occupational Therapy; Future    Other orders  -     methylPREDNISolone (MEDROL DOSEPACK) 4 mg tablet; use as directed      I counseled the patient on his conditions, their implications and medical management.    Given stretching exercises to help stretch the tight Achilles complex contributing to plantar fasciitis.      rx physical therapy as this is a chronic recurring issue    Rxed  right xrays    Patient encouraged to purchase good supportive shoes and spenco rx inserts.    Handout dispensed with information in regards to several type of Spenco Inserts along with purchasing information.     Avoid barefoot  May benefit from custom inserts in the future    Medrol dose ramón prescribed, advised patient to take meds as directed. Patient should complete medication. If problems occur notify the clinic  Per cardiology patient is to avoid long-term NSAID use    Consider injection, MRI, immobilization,  next visit        Will further explore treatment for right 4th digit nail likely recommend nail avulsion as it is only 1 nail affected in topical may only minimally improved.  Will send nail for fungal sample with removal.  Will plan for  after heel pain resolves       Follow up in about 4 weeks (around 6/22/2022).

## 2022-05-26 ENCOUNTER — CLINICAL SUPPORT (OUTPATIENT)
Dept: REHABILITATION | Facility: HOSPITAL | Age: 52
End: 2022-05-26
Attending: PODIATRIST
Payer: COMMERCIAL

## 2022-05-26 DIAGNOSIS — G89.29 HEEL PAIN, CHRONIC, RIGHT: ICD-10-CM

## 2022-05-26 DIAGNOSIS — M79.671 RIGHT FOOT PAIN: ICD-10-CM

## 2022-05-26 DIAGNOSIS — M79.671 HEEL PAIN, CHRONIC, RIGHT: ICD-10-CM

## 2022-05-26 DIAGNOSIS — M72.2 PLANTAR FASCIITIS: ICD-10-CM

## 2022-05-26 PROCEDURE — 97161 PT EVAL LOW COMPLEX 20 MIN: CPT | Mod: PO

## 2022-05-26 PROCEDURE — 97110 THERAPEUTIC EXERCISES: CPT | Mod: PO

## 2022-05-26 NOTE — PLAN OF CARE
[facility]   Physical Therapy Initial Evaluation     Date: 5/26/2022   Name: Oleksandr Dawkins  Clinic Number: [id number]    Therapy Diagnosis:   Encounter Diagnoses   Name Primary?    Plantar fasciitis     Heel pain, chronic, right     Right foot pain      Physician: Marika Parish DPM    Physician Orders: PT Eval and Treat   Medical Diagnosis from Referral:   M72.2 (ICD-10-CM) - Plantar fasciitis   M79.671,G89.29 (ICD-10-CM) - Heel pain, chronic, right       Evaluation Date: 5/26/2022  Authorization Period Expiration: 12/31/2022  Plan of Care Expiration: 7/26/2022  Progress Note Due: 6/26/2022  Visit # / Visits authorized: 1/ 1   FOTO: 1/3    Precautions: Standard and Hx of MI     Time In: 8:45  Time Out: 9:30  Total Appointment Time (timed & untimed codes): 45 minutes      SUBJECTIVE     Date of onset: pt reported that he has had pain in his R foot for about 6 months.  By noon it is not as bad. Worse first thing in the morning    History of current condition - OLEKSANDR reports: He had the same pain on the L 20 years ago when he used a night splint, insoles and stretching.  Pt was instructed by Dr. Parish to obtain Spenco 3/4 insoles.    Falls: no    Imaging, X-Ray:       Prior Therapy: yes knees  Social History:  Pt lives with his wife and is moving in with his Mother in Law no steps in either house.  Walk dogs one time per day 20 - 30 min   Occupation: [employer].  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  Now helping Mother in Law and moving in the house.  Prior Level of Function: independent in all ADL's   Current Level of Function: Pt has trouble walking after sleep or prolonged sitting    Pain:  Current 3/10, worst 7/10, best 3/10   Location: right feet    Description: Throbbing  Aggravating Factors: sit to stand  Easing Factors: moving or warming up the foot and advil    Patients goals: to decrease his R foot pain.     Medical  History:   Past Medical History:   Diagnosis Date    Heart attack     Hx of vasectomy 2007    MI (myocardial infarction) 2007    1 stent placed        Surgical History:   Sarwat Dawkins  has a past surgical history that includes Cholecystectomy; gastric sleeve; and Coronary angioplasty with stent.    Medications:   Sarwat has a current medication list which includes the following prescription(s): aspirin, methylprednisolone, rosuvastatin, stelara, and valsartan.    Allergies:   Review of patient's allergies indicates:   Allergen Reactions    Penicillins           OBJECTIVE     Observation: Pt was able to enter the clinic ambulating independently without an assistive device.    Posture: WFL      Range of Motion:   Ankle Left active Left Passive Right Active R passive   Dorsiflexion 10 nt 6 nt   Plantar flexion 45 nt 47 nt           Lower Extremity Strength  Right LE  Left LE    Knee extension: nt Knee extension: nt   Knee flexion: nt Knee flexion: nt   Hip flexion: nt Hip flexion: nt   Hip extension:  nt Hip extension: nt   Hip abduction: nt Hip abduction: nt   Hip adduction: nt Hip adduction nt   Ankle dorsiflexion: 5/5 Ankle dorsiflexion: 5/5   Ankle plantarflexion: 2/5 Ankle plantarflexion: 2/5   Ankle inversion  5/5     5/5  Ankle eversion   5/5     5/5      Step down test: nt      Function:    - SLS R: nt  - SLS L: nt  - Squat: nt   - Sit <--> Stand:independent   - Bed Mobility: independent        Joint Mobility: nt      Palpation: tender on the plantar surface of the R heel and medial to the R heel    Sensation: intact    Flexibility:    Ely's test: R = nt degrees ; L = nt degrees   Popliteal Angle: R = nt degrees ; L = nt degrees   Puma's test: R = nt ; L = nt   David test: R = nt ; L = nt   Gastrocnemius: R = 9  ,L =  7   Soleus: R = 5 , L = 0  FOERFOOT VARUS: r = 25, l = 15  Edema: none    TREATMENT     Total Treatment time (time-based codes) separate from Evaluation: 15 minutes      SARWAT received the  treatments listed below:      therapeutic exercises to develop flexibility for 10 minutes including:  Gastrocnemius towel stretch 3 x 30 sec  Soleus towel stretch 3 x 30 sec  Mobilizing plantar fascia with a ball    manual therapy techniques: Soft tissue Mobilization were applied to the: R calf and plantar fascia  for 5 minutes, including:  Soft tissue mobilization to the R calf and plantar fascia      PATIENT EDUCATION AND HOME EXERCISES     Education provided:   - yes    Written Home Exercises Provided: yes. Exercises were reviewed and OLEKSANDR was able to demonstrate them prior to the end of the session.  OLEKSANDR demonstrated good  understanding of the education provided. See EMR under Patient Instructions for exercises provided during therapy sessions.    ASSESSMENT     Oleksandr is a 51 y.o. male referred to outpatient Physical Therapy with a medical diagnosis of   M72.2 (ICD-10-CM) - Plantar fasciitis   M79.671,G89.29 (ICD-10-CM) - Heel pain, chronic, right     . Patient presents with pain with palpation of his R heel - plantar surface and medial side of heel and decreased gastrocnemius and soleus flexibility.    Patient prognosis is Good.   Patient will benefit from skilled outpatient Physical Therapy to address the deficits stated above and in the chart below, provide patient /family education, and to maximize patientt's level of independence.     Plan of care discussed with patient: Yes  Patient's spiritual, cultural and educational needs considered and patient is agreeable to the plan of care and goals as stated below:     Anticipated Barriers for therapy: scheduling    Medical Necessity is demonstrated by the following  History  Co-morbidities and personal factors that may impact the plan of care Co-morbidities:   Hx of MI    Personal Factors:   no deficits     low   Examination  Body Structures and Functions, activity limitations and participation restrictions that may impact the plan of care Body Regions:    lower extremities    Body Systems:    flexibility    Participation Restrictions:   none    Activity limitations:   Learning and applying knowledge  no deficits    General Tasks and Commands  no deficits    Communication  no deficits    Mobility  walking    Self care  no deficits    Domestic Life  no deficits    Interactions/Relationships  no deficits    Life Areas  no deficits    Community and Social Life  recreation and leisure         low   Clinical Presentation stable and uncomplicated low   Decision Making/ Complexity Score: low     Goals:  Short Term Goals: 3 weeks   1. Pt will be instructed in an exercise program to address functional deficits related to his R foot Sx.  2. Pt will be compliant with his Physical Therapy treatments  3. Improve R gastrocnemius and soleus flexibility by 4 degrees    Long Term Goals: 6 weeks   1. Pt will be independent in a HEP to assist in managing his R plantar fascitis  2. Pt will be compliant with his HEP  3. Improve R gastrocnemius and soleus flexibility by 8 degrees  4. Will report decreased pain in his R heel first thing in the morning    PLAN   Plan of care Certification: 5/26/2022 to 7/26/2022.    Outpatient Physical Therapy 2 times weekly for 6 weeks to include the following interventions: Electrical Stimulation as indicated, Gait Training, Manual Therapy, Moist Heat/ Ice, Neuromuscular Re-ed, Patient Education, Self Care, Therapeutic Activities, Therapeutic Exercise and dry needling.     Amor Cunningham, PT      I CERTIFY THE NEED FOR THESE SERVICES FURNISHED UNDER THIS PLAN OF TREATMENT AND WHILE UNDER MY CARE   Physician's comments:     Physician's Signature: ___________________________________________________

## 2022-05-26 NOTE — PROGRESS NOTES
[facility]   Physical Therapy Initial Evaluation     Date: 5/26/2022   Name: Oleksandr Dawkins  Clinic Number: [id number]    Therapy Diagnosis:   Encounter Diagnoses   Name Primary?    Plantar fasciitis     Heel pain, chronic, right     Right foot pain      Physician: Marika Parish DPM    Physician Orders: PT Eval and Treat   Medical Diagnosis from Referral:   M72.2 (ICD-10-CM) - Plantar fasciitis   M79.671,G89.29 (ICD-10-CM) - Heel pain, chronic, right       Evaluation Date: 5/26/2022  Authorization Period Expiration: 12/31/2022  Plan of Care Expiration: 7/26/2022  Progress Note Due: 6/26/2022  Visit # / Visits authorized: 1/ 1   FOTO: 1/3    Precautions: Standard and Hx of MI     Time In: 8:45  Time Out: 9:30  Total Appointment Time (timed & untimed codes): 45 minutes      SUBJECTIVE     Date of onset: pt reported that he has had pain in his R foot for about 6 months.  By noon it is not as bad. Worse first thing in the morning    History of current condition - OLEKSANDR reports: He had the same pain on the L 20 years ago when he used a night splint, insoles and stretching.  Pt was instructed by Dr. Parish to obtain Spenco 3/4 insoles.    Falls: no    Imaging, X-Ray:       Prior Therapy: yes knees  Social History:  Pt lives with his wife and is moving in with his Mother in Law no steps in either house.  Walk dogs one time per day 20 - 30 min   Occupation: [employer].  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  Now helping Mother in Law and moving in the house.  Prior Level of Function: independent in all ADL's   Current Level of Function: Pt has trouble walking after sleep or prolonged sitting    Pain:  Current 3/10, worst 7/10, best 3/10   Location: right feet    Description: Throbbing  Aggravating Factors: sit to stand  Easing Factors: moving or warming up the foot and advil    Patients goals: to decrease his R foot pain.     Medical  History:   Past Medical History:   Diagnosis Date    Heart attack     Hx of vasectomy 2007    MI (myocardial infarction) 2007    1 stent placed        Surgical History:   Sarwat Dawkins  has a past surgical history that includes Cholecystectomy; gastric sleeve; and Coronary angioplasty with stent.    Medications:   Sarwat has a current medication list which includes the following prescription(s): aspirin, methylprednisolone, rosuvastatin, stelara, and valsartan.    Allergies:   Review of patient's allergies indicates:   Allergen Reactions    Penicillins           OBJECTIVE     Observation: Pt was able to enter the clinic ambulating independently without an assistive device.    Posture: WFL      Range of Motion:   Ankle Left active Left Passive Right Active R passive   Dorsiflexion 10 nt 6 nt   Plantar flexion 45 nt 47 nt           Lower Extremity Strength  Right LE  Left LE    Knee extension: nt Knee extension: nt   Knee flexion: nt Knee flexion: nt   Hip flexion: nt Hip flexion: nt   Hip extension:  nt Hip extension: nt   Hip abduction: nt Hip abduction: nt   Hip adduction: nt Hip adduction nt   Ankle dorsiflexion: 5/5 Ankle dorsiflexion: 5/5   Ankle plantarflexion: 2/5 Ankle plantarflexion: 2/5   Ankle inversion  5/5     5/5  Ankle eversion   5/5     5/5      Step down test: nt      Function:    - SLS R: nt  - SLS L: nt  - Squat: nt   - Sit <--> Stand:independent   - Bed Mobility: independent        Joint Mobility: nt      Palpation: tender on the plantar surface of the R heel and medial to the R heel    Sensation: intact    Flexibility:    Ely's test: R = nt degrees ; L = nt degrees   Popliteal Angle: R = nt degrees ; L = nt degrees   Puma's test: R = nt ; L = nt   David test: R = nt ; L = nt   Gastrocnemius: R = 9  ,L =  7   Soleus: R = 5 , L = 0  FOERFOOT VARUS: r = 25, l = 15  Edema: none    TREATMENT     Total Treatment time (time-based codes) separate from Evaluation: 15 minutes      SARWAT received the  treatments listed below:      therapeutic exercises to develop flexibility for 10 minutes including:  Gastrocnemius towel stretch 3 x 30 sec  Soleus towel stretch 3 x 30 sec  Mobilizing plantar fascia with a ball    manual therapy techniques: Soft tissue Mobilization were applied to the: R calf and plantar fascia  for 5 minutes, including:  Soft tissue mobilization to the R calf and plantar fascia      PATIENT EDUCATION AND HOME EXERCISES     Education provided:   - yes    Written Home Exercises Provided: yes. Exercises were reviewed and OLEKSANDR was able to demonstrate them prior to the end of the session.  OLEKSANDR demonstrated good  understanding of the education provided. See EMR under Patient Instructions for exercises provided during therapy sessions.    ASSESSMENT     Oleksandr is a 51 y.o. male referred to outpatient Physical Therapy with a medical diagnosis of   M72.2 (ICD-10-CM) - Plantar fasciitis   M79.671,G89.29 (ICD-10-CM) - Heel pain, chronic, right     . Patient presents with pain with palpation of his R heel - plantar surface and medial side of heel and decreased gastrocnemius and soleus flexibility.    Patient prognosis is Good.   Patient will benefit from skilled outpatient Physical Therapy to address the deficits stated above and in the chart below, provide patient /family education, and to maximize patientt's level of independence.     Plan of care discussed with patient: Yes  Patient's spiritual, cultural and educational needs considered and patient is agreeable to the plan of care and goals as stated below:     Anticipated Barriers for therapy: scheduling    Medical Necessity is demonstrated by the following  History  Co-morbidities and personal factors that may impact the plan of care Co-morbidities:   Hx of MI    Personal Factors:   no deficits     low   Examination  Body Structures and Functions, activity limitations and participation restrictions that may impact the plan of care Body Regions:    lower extremities    Body Systems:    flexibility    Participation Restrictions:   none    Activity limitations:   Learning and applying knowledge  no deficits    General Tasks and Commands  no deficits    Communication  no deficits    Mobility  walking    Self care  no deficits    Domestic Life  no deficits    Interactions/Relationships  no deficits    Life Areas  no deficits    Community and Social Life  recreation and leisure         low   Clinical Presentation stable and uncomplicated low   Decision Making/ Complexity Score: low     Goals:  Short Term Goals: 3 weeks   1. Pt will be instructed in an exercise program to address functional deficits related to his R foot Sx.  2. Pt will be compliant with his Physical Therapy treatments  3. Improve R gastrocnemius and soleus flexibility by 4 degrees    Long Term Goals: 6 weeks   1. Pt will be independent in a HEP to assist in managing his R plantar fascitis  2. Pt will be compliant with his HEP  3. Improve R gastrocnemius and soleus flexibility by 8 degrees  4. Will report decreased pain in his R heel first thing in the morning    PLAN   Plan of care Certification: 5/26/2022 to 7/26/2022.    Outpatient Physical Therapy 2 times weekly for 6 weeks to include the following interventions: Electrical Stimulation as indicated, Gait Training, Manual Therapy, Moist Heat/ Ice, Neuromuscular Re-ed, Patient Education, Self Care, Therapeutic Activities, Therapeutic Exercise and dry needling.     Amor Cunningham, PT      I CERTIFY THE NEED FOR THESE SERVICES FURNISHED UNDER THIS PLAN OF TREATMENT AND WHILE UNDER MY CARE   Physician's comments:     Physician's Signature: ___________________________________________________

## 2022-06-06 NOTE — PROGRESS NOTES
[facility]   Physical Therapy Treatment Note     Name: Oleksandr Dawkins  Clinic Number: [id number]    Therapy Diagnosis:   Encounter Diagnosis   Name Primary?    Right foot pain Yes     Physician: Marika Parish DPM    Visit Date: 6/7/2022    Physician Orders: PT Eval and Treat   Medical Diagnosis from Referral:   M72.2 (ICD-10-CM) - Plantar fasciitis   M79.671,G89.29 (ICD-10-CM) - Heel pain, chronic, right         Evaluation Date: 5/26/2022  Authorization Period Expiration: 12/31/2022  Plan of Care Expiration: 7/26/2022  Progress Note Due: 6/26/2022  Visit # / Visits authorized: 1/ 1   FOTO: 1/3     Precautions: Standard and Hx of MI     PTA Visit #: 0/5     Time In: 8:15 am  Time Out: 8:53  Total Billable Time: 38 minutes    SUBJECTIVE     Pt reports: He reports that everything is going fine. Got his in souls and did the stretching and feels 95% better      He was compliant with home exercise program.  Response to previous treatment: reduced pain  Functional change: ongoing     Pain: 0/10  Location: right foot      OBJECTIVE     Objective Measures updated at progress report unless specified.     Treatment     OLEKSANDR received the treatments listed below:      therapeutic exercises to develop flexibility for 30 minutes including:  Upright bike - 5 mins Lev 1  Gastrocnemius towel stretch 3 x 30 sec  Soleus towel stretch 3 x 30 sec  Mobilizing plantar fascia with a ball  +Seated heel raise +10# weight - 3x10   +Ankle 4-way GTB - x20     manual therapy techniques: Soft tissue Mobilization were applied to the: R calf and plantar fascia  for 08 minutes, including:  Soft tissue mobilization to the R calf and plantar fascia via IASTM    Patient Education and Home Exercises     Home Exercises Provided and Patient Education Provided     Education provided:   - HEP, POC, answered patient questions    Written Home Exercises Provided: yes. Exercises were reviewed and OLEKSANDR was able to demonstrate them  prior to the end of the session.  SARWAT demonstrated good  understanding of the education provided. See EMR under Patient Instructions for exercises provided during therapy sessions    ASSESSMENT     The patient has responded very well to the current plan an arrives with minimal pain. Therefore, minimal changes were made. We added light ankle strengthening to program, but this was not added to HEP at this time. Will likely be ready to progress next visit.    SARWAT Is progressing well towards his goals.   Pt prognosis is Good.     Pt will continue to benefit from skilled outpatient physical therapy to address the deficits listed in the problem list box on initial evaluation, provide pt/family education and to maximize pt's level of independence in the home and community environment.     Pt's spiritual, cultural and educational needs considered and pt agreeable to plan of care and goals.     Anticipated barriers to physical therapy: scheduling    Goals:  Short Term Goals: 3 weeks   1. Pt will be instructed in an exercise program to address functional deficits related to his R foot Sx.  2. Pt will be compliant with his Physical Therapy treatments  3. Improve R gastrocnemius and soleus flexibility by 4 degrees     Long Term Goals: 6 weeks   1. Pt will be independent in a HEP to assist in managing his R plantar fascitis  2. Pt will be compliant with his HEP  3. Improve R gastrocnemius and soleus flexibility by 8 degrees  4. Will report decreased pain in his R heel first thing in the morning    PLAN     Cont POC    Segun Martin, PT

## 2022-06-07 ENCOUNTER — CLINICAL SUPPORT (OUTPATIENT)
Dept: REHABILITATION | Facility: HOSPITAL | Age: 52
End: 2022-06-07
Attending: PODIATRIST
Payer: COMMERCIAL

## 2022-06-07 DIAGNOSIS — M79.671 RIGHT FOOT PAIN: Primary | ICD-10-CM

## 2022-06-07 PROCEDURE — 97140 MANUAL THERAPY 1/> REGIONS: CPT | Mod: PO

## 2022-06-07 PROCEDURE — 97110 THERAPEUTIC EXERCISES: CPT | Mod: PO

## 2022-06-27 ENCOUNTER — TELEPHONE (OUTPATIENT)
Dept: CARDIOLOGY | Facility: CLINIC | Age: 52
End: 2022-06-27
Payer: COMMERCIAL

## 2022-06-27 ENCOUNTER — LAB VISIT (OUTPATIENT)
Dept: LAB | Facility: HOSPITAL | Age: 52
End: 2022-06-27
Attending: INTERNAL MEDICINE
Payer: COMMERCIAL

## 2022-06-27 DIAGNOSIS — E88.810 METABOLIC SYNDROME: ICD-10-CM

## 2022-06-27 DIAGNOSIS — L40.9 PSORIASIS: ICD-10-CM

## 2022-06-27 DIAGNOSIS — I25.2 OLD MYOCARDIAL INFARCTION: ICD-10-CM

## 2022-06-27 DIAGNOSIS — Z90.3 S/P GASTRIC SLEEVE PROCEDURE: ICD-10-CM

## 2022-06-27 DIAGNOSIS — E66.01 MORBID OBESITY: ICD-10-CM

## 2022-06-27 DIAGNOSIS — I25.10 CORONARY ARTERY DISEASE INVOLVING NATIVE CORONARY ARTERY OF NATIVE HEART WITHOUT ANGINA PECTORIS: ICD-10-CM

## 2022-06-27 DIAGNOSIS — E78.5 DYSLIPIDEMIA: ICD-10-CM

## 2022-06-27 LAB
ALBUMIN SERPL BCP-MCNC: 3.7 G/DL (ref 3.5–5.2)
ALP SERPL-CCNC: 65 U/L (ref 55–135)
ALT SERPL W/O P-5'-P-CCNC: 31 U/L (ref 10–44)
AST SERPL-CCNC: 24 U/L (ref 10–40)
BILIRUB DIRECT SERPL-MCNC: 0.3 MG/DL (ref 0.1–0.3)
BILIRUB SERPL-MCNC: 0.8 MG/DL (ref 0.1–1)
CHOLEST SERPL-MCNC: 131 MG/DL (ref 120–199)
CHOLEST/HDLC SERPL: 3.6 {RATIO} (ref 2–5)
HDLC SERPL-MCNC: 36 MG/DL (ref 40–75)
HDLC SERPL: 27.5 % (ref 20–50)
LDLC SERPL CALC-MCNC: 66 MG/DL (ref 63–159)
NONHDLC SERPL-MCNC: 95 MG/DL
PROT SERPL-MCNC: 6.5 G/DL (ref 6–8.4)
TRIGL SERPL-MCNC: 145 MG/DL (ref 30–150)

## 2022-06-27 PROCEDURE — 36415 COLL VENOUS BLD VENIPUNCTURE: CPT | Mod: PO | Performed by: INTERNAL MEDICINE

## 2022-06-27 PROCEDURE — 80076 HEPATIC FUNCTION PANEL: CPT | Performed by: INTERNAL MEDICINE

## 2022-06-27 PROCEDURE — 80061 LIPID PANEL: CPT | Performed by: INTERNAL MEDICINE

## 2022-06-27 NOTE — TELEPHONE ENCOUNTER
----- Message from Janet Mascorro MD sent at 6/27/2022  2:22 PM CDT -----  Please inform the patient that his lipids have much improved.  HDL-C is still low, but better. Increasing physical activity could help to improve it further.  Continue on the present dose of Crestor.   Repeat blood work with return visit.

## 2022-06-27 NOTE — PROGRESS NOTES
Please inform the patient that his lipids have much improved.  HDL-C is still low, but better. Increasing physical activity could help to improve it further.  Continue on the present dose of Crestor.   Repeat blood work with return visit.

## 2022-06-28 ENCOUNTER — OFFICE VISIT (OUTPATIENT)
Dept: PODIATRY | Facility: CLINIC | Age: 52
End: 2022-06-28
Payer: COMMERCIAL

## 2022-06-28 VITALS
DIASTOLIC BLOOD PRESSURE: 75 MMHG | SYSTOLIC BLOOD PRESSURE: 112 MMHG | BODY MASS INDEX: 40.93 KG/M2 | HEART RATE: 75 BPM | WEIGHT: 260.81 LBS | HEIGHT: 67 IN

## 2022-06-28 DIAGNOSIS — M72.2 PLANTAR FASCIITIS: Primary | ICD-10-CM

## 2022-06-28 DIAGNOSIS — M76.61 ACHILLES TENDINITIS OF RIGHT LOWER EXTREMITY: ICD-10-CM

## 2022-06-28 DIAGNOSIS — B35.3 TINEA PEDIS OF BOTH FEET: ICD-10-CM

## 2022-06-28 DIAGNOSIS — L60.9 DISEASE OF NAIL: ICD-10-CM

## 2022-06-28 PROCEDURE — 3078F PR MOST RECENT DIASTOLIC BLOOD PRESSURE < 80 MM HG: ICD-10-PCS | Mod: CPTII,S$GLB,, | Performed by: PODIATRIST

## 2022-06-28 PROCEDURE — 3074F SYST BP LT 130 MM HG: CPT | Mod: CPTII,S$GLB,, | Performed by: PODIATRIST

## 2022-06-28 PROCEDURE — 99213 OFFICE O/P EST LOW 20 MIN: CPT | Mod: S$GLB,,, | Performed by: PODIATRIST

## 2022-06-28 PROCEDURE — 3078F DIAST BP <80 MM HG: CPT | Mod: CPTII,S$GLB,, | Performed by: PODIATRIST

## 2022-06-28 PROCEDURE — 3074F PR MOST RECENT SYSTOLIC BLOOD PRESSURE < 130 MM HG: ICD-10-PCS | Mod: CPTII,S$GLB,, | Performed by: PODIATRIST

## 2022-06-28 PROCEDURE — 3008F BODY MASS INDEX DOCD: CPT | Mod: CPTII,S$GLB,, | Performed by: PODIATRIST

## 2022-06-28 PROCEDURE — 3044F HG A1C LEVEL LT 7.0%: CPT | Mod: CPTII,S$GLB,, | Performed by: PODIATRIST

## 2022-06-28 PROCEDURE — 4010F ACE/ARB THERAPY RXD/TAKEN: CPT | Mod: CPTII,S$GLB,, | Performed by: PODIATRIST

## 2022-06-28 PROCEDURE — 1159F MED LIST DOCD IN RCRD: CPT | Mod: CPTII,S$GLB,, | Performed by: PODIATRIST

## 2022-06-28 PROCEDURE — 99213 PR OFFICE/OUTPT VISIT, EST, LEVL III, 20-29 MIN: ICD-10-PCS | Mod: S$GLB,,, | Performed by: PODIATRIST

## 2022-06-28 PROCEDURE — 1159F PR MEDICATION LIST DOCUMENTED IN MEDICAL RECORD: ICD-10-PCS | Mod: CPTII,S$GLB,, | Performed by: PODIATRIST

## 2022-06-28 PROCEDURE — 99999 PR PBB SHADOW E&M-EST. PATIENT-LVL III: ICD-10-PCS | Mod: PBBFAC,,, | Performed by: PODIATRIST

## 2022-06-28 PROCEDURE — 3044F PR MOST RECENT HEMOGLOBIN A1C LEVEL <7.0%: ICD-10-PCS | Mod: CPTII,S$GLB,, | Performed by: PODIATRIST

## 2022-06-28 PROCEDURE — 99999 PR PBB SHADOW E&M-EST. PATIENT-LVL III: CPT | Mod: PBBFAC,,, | Performed by: PODIATRIST

## 2022-06-28 PROCEDURE — 4010F PR ACE/ARB THEARPY RXD/TAKEN: ICD-10-PCS | Mod: CPTII,S$GLB,, | Performed by: PODIATRIST

## 2022-06-28 PROCEDURE — 3008F PR BODY MASS INDEX (BMI) DOCUMENTED: ICD-10-PCS | Mod: CPTII,S$GLB,, | Performed by: PODIATRIST

## 2022-06-28 RX ORDER — TERBINAFINE HYDROCHLORIDE 250 MG/1
250 TABLET ORAL DAILY
Qty: 14 TABLET | Refills: 0 | Status: SHIPPED | OUTPATIENT
Start: 2022-06-28 | End: 2022-07-12

## 2022-06-28 NOTE — PROGRESS NOTES
"Subjective:      Patient ID: Oleksandr Dawkins is a 51 y.o. male.    Chief Complaint:   Follow-up and Plantar Fasciitis (Right foot)    Oleksandr is a 51 y.o. male who returns s to the podiatry clinic  with complaint of  right foot pain. .  F/u p.therapy.  Patient relates he went to physical therapy and felt that it possibly worsen the symptoms     took medrol dose pack and Plantar fascia gone " a lot better"     Now achilles tendonitis pain is 2/10     Patient relates he has been wearing good shoes.  He does relate he has been moving his home in [place] to here and doing a lot a lifting walking and standing recently      Inquiring about athletes foot treatment and plan for nail removal  Patient would like to proceed with nail removal     Past Medical History:   Diagnosis Date    Heart attack     Hx of vasectomy 2007    MI (myocardial infarction) 2007    1 stent placed      Past Surgical History:   Procedure Laterality Date    CHOLECYSTECTOMY      CORONARY ANGIOPLASTY WITH STENT PLACEMENT      gastric sleeve       Current Outpatient Medications on File Prior to Visit   Medication Sig Dispense Refill    aspirin (ECOTRIN) 81 MG EC tablet Take 81 mg by mouth once daily.      methylPREDNISolone (MEDROL DOSEPACK) 4 mg tablet use as directed 1 each 0    rosuvastatin (CRESTOR) 20 MG tablet Take 1 tablet (20 mg total) by mouth once daily. 30 tablet 6    STELARA 90 mg/mL Syrg syringe Inject into the skin every 3 (three) months.      valsartan (DIOVAN) 40 MG tablet Take 1 tablet (40 mg total) by mouth once daily. 30 tablet 6     No current facility-administered medications on file prior to visit.     Review of patient's allergies indicates:   Allergen Reactions    Penicillins        Review of Systems   Constitutional: Negative for chills, decreased appetite, fever, malaise/fatigue, night sweats, weight gain and weight loss.   Cardiovascular: Negative for chest pain, claudication, dyspnea on exertion, leg swelling, " "palpitations and syncope.   Respiratory: Negative for cough and shortness of breath.    Endocrine: Negative for cold intolerance and heat intolerance.   Hematologic/Lymphatic: Negative for bleeding problem. Does not bruise/bleed easily.   Skin: Positive for nail changes. Negative for color change, dry skin, flushing, itching, poor wound healing, rash, skin cancer, suspicious lesions and unusual hair distribution.        interspace   Musculoskeletal: Positive for joint pain (History of knee pain). Negative for arthritis, back pain, falls, gout, joint swelling, muscle cramps, muscle weakness, myalgias, neck pain and stiffness.        Foot pain   Gastrointestinal: Negative for diarrhea, nausea and vomiting.   Neurological: Negative for dizziness, focal weakness, light-headedness, numbness, paresthesias, tremors, vertigo and weakness.   Psychiatric/Behavioral: Negative for altered mental status and depression. The patient does not have insomnia.    Allergic/Immunologic: Negative.            Objective:       Vitals:    06/28/22 1000   BP: 112/75   Pulse: 75   Weight: 118.3 kg (260 lb 12.9 oz)   Height: 5' 7" (1.702 m)   PainSc:   2   PainLoc: Foot   118.3 kg (260 lb 12.9 oz)     Physical Exam  Vitals reviewed.   Constitutional:       General: He is not in acute distress.     Appearance: He is well-developed. He is not ill-appearing, toxic-appearing or diaphoretic.      Comments: Proper supportive shoegear      Cardiovascular:      Pulses:           Dorsalis pedis pulses are 2+ on the right side and 2+ on the left side.        Posterior tibial pulses are 2+ on the right side and 2+ on the left side.   Musculoskeletal:         General: No swelling.      Right lower leg: No edema.      Left lower leg: No edema.      Right ankle: Normal. No swelling.      Right Achilles Tendon: Normal. No tenderness.      Left ankle: Normal. No swelling.      Left Achilles Tendon: Normal. No tenderness.      Right foot: Decreased range of " motion. Deformity, prominent metatarsal heads and tenderness present. No bony tenderness.      Left foot: Decreased range of motion. Deformity and prominent metatarsal heads present. No tenderness or bony tenderness.      Comments: - pain to palpation inferior heel right at medial calcaneal tubercle without ecchymosis, erythema, edema, or cardinal signs infection, and no signs of trauma.  Mild pain with heel raise single right    No pain ankle     + mild pain to  Achilles    Bilateral heel varus foot type with stance semi reducible  Flexible forefoot   Feet:      Right foot:      Protective Sensation: 10 sites tested. 10 sites sensed.      Skin integrity: No ulcer, blister, skin breakdown, erythema, warmth, callus or dry skin.      Toenail Condition: Right toenails are abnormally thick.      Left foot:      Protective Sensation: 10 sites tested. 10 sites sensed.      Skin integrity: No ulcer, blister, skin breakdown, erythema, warmth, callus or dry skin.      Toenail Condition: Left toenails are normal.      Comments: Right 4th digit nail thickened discolored dystrophic possibly fungal other nails are clear  No pitting    No skin patches skin is clear    Interspace macerations  Skin:     General: Skin is warm.      Capillary Refill: Capillary refill takes 2 to 3 seconds.      Coloration: Skin is not pale.      Findings: No erythema or rash.      Nails: There is no clubbing.   Neurological:      Mental Status: He is alert and oriented to person, place, and time.      Sensory: No sensory deficit.      Gait: Gait is intact. Gait normal.   Psychiatric:         Attention and Perception: Attention normal.         Mood and Affect: Mood normal.         Speech: Speech normal.         Behavior: Behavior normal.         Thought Content: Thought content normal.         Cognition and Memory: Cognition normal.         Judgment: Judgment normal.               Assessment:       Encounter Diagnoses   Name Primary?    Plantar  fasciitis Yes    Achilles tendinitis of right lower extremity     Disease of nail     Tinea pedis of both feet          Plan:       Oleksandr was seen today for follow-up and plantar fasciitis.    Diagnoses and all orders for this visit:    Plantar fasciitis    Achilles tendinitis of right lower extremity    Disease of nail    Tinea pedis of both feet    Other orders  -     terbinafine HCL (LAMISIL) 250 mg tablet; Take 1 tablet (250 mg total) by mouth once daily. for 14 days      I counseled the patient on his conditions, their implications and medical management.     - rx oral lamisil. lft labs wnl    - applied g.violet    - p.fascia resolved.  Now with achilles tendonitis. Consider p.thearpy, MRI    - d/w pt shoegear    - rtc for nail avulsion        -   Follow up if symptoms worsen or fail to improve.

## 2022-07-31 ENCOUNTER — PATIENT MESSAGE (OUTPATIENT)
Dept: CARDIOLOGY | Facility: CLINIC | Age: 52
End: 2022-07-31
Payer: COMMERCIAL

## 2022-08-01 DIAGNOSIS — I25.10 CORONARY ARTERY DISEASE INVOLVING NATIVE CORONARY ARTERY OF NATIVE HEART WITHOUT ANGINA PECTORIS: Primary | ICD-10-CM

## 2022-08-01 DIAGNOSIS — E78.5 DYSLIPIDEMIA: ICD-10-CM

## 2022-10-18 ENCOUNTER — LAB VISIT (OUTPATIENT)
Dept: LAB | Facility: HOSPITAL | Age: 52
End: 2022-10-18
Attending: INTERNAL MEDICINE
Payer: COMMERCIAL

## 2022-10-18 DIAGNOSIS — I25.10 CORONARY ARTERY DISEASE INVOLVING NATIVE CORONARY ARTERY OF NATIVE HEART WITHOUT ANGINA PECTORIS: ICD-10-CM

## 2022-10-18 DIAGNOSIS — E78.5 DYSLIPIDEMIA: ICD-10-CM

## 2022-10-18 LAB
ALBUMIN SERPL BCP-MCNC: 3.9 G/DL (ref 3.5–5.2)
ALP SERPL-CCNC: 73 U/L (ref 55–135)
ALT SERPL W/O P-5'-P-CCNC: 26 U/L (ref 10–44)
ANION GAP SERPL CALC-SCNC: 14 MMOL/L (ref 8–16)
AST SERPL-CCNC: 24 U/L (ref 10–40)
BILIRUB SERPL-MCNC: 0.9 MG/DL (ref 0.1–1)
BUN SERPL-MCNC: 14 MG/DL (ref 6–20)
CALCIUM SERPL-MCNC: 9 MG/DL (ref 8.7–10.5)
CHLORIDE SERPL-SCNC: 104 MMOL/L (ref 95–110)
CO2 SERPL-SCNC: 23 MMOL/L (ref 23–29)
CREAT SERPL-MCNC: 0.9 MG/DL (ref 0.5–1.4)
EST. GFR  (NO RACE VARIABLE): >60 ML/MIN/1.73 M^2
ESTIMATED AVG GLUCOSE: 108 MG/DL (ref 68–131)
GLUCOSE SERPL-MCNC: 93 MG/DL (ref 70–110)
HBA1C MFR BLD: 5.4 % (ref 4–5.6)
POTASSIUM SERPL-SCNC: 4.1 MMOL/L (ref 3.5–5.1)
PROT SERPL-MCNC: 6.7 G/DL (ref 6–8.4)
SODIUM SERPL-SCNC: 141 MMOL/L (ref 136–145)
TSH SERPL DL<=0.005 MIU/L-ACNC: 2.47 UIU/ML (ref 0.4–4)

## 2022-10-18 PROCEDURE — 83036 HEMOGLOBIN GLYCOSYLATED A1C: CPT | Performed by: INTERNAL MEDICINE

## 2022-10-18 PROCEDURE — 84443 ASSAY THYROID STIM HORMONE: CPT | Performed by: INTERNAL MEDICINE

## 2022-10-18 PROCEDURE — 80053 COMPREHEN METABOLIC PANEL: CPT | Performed by: INTERNAL MEDICINE

## 2022-10-18 PROCEDURE — 36415 COLL VENOUS BLD VENIPUNCTURE: CPT | Mod: PO | Performed by: INTERNAL MEDICINE

## 2022-10-20 ENCOUNTER — TELEPHONE (OUTPATIENT)
Dept: CARDIOLOGY | Facility: CLINIC | Age: 52
End: 2022-10-20
Payer: COMMERCIAL

## 2022-10-20 NOTE — TELEPHONE ENCOUNTER
----- Message from Janet Mascorro MD sent at 10/20/2022 12:12 PM CDT -----  Please inform the patient that his blood work is fine.  I do not see the results of lipids, but the ones from June were OK.

## 2022-10-20 NOTE — PROGRESS NOTES
Please inform the patient that his blood work is fine.  I do not see the results of lipids, but the ones from June were OK.

## 2022-10-24 ENCOUNTER — OFFICE VISIT (OUTPATIENT)
Dept: CARDIOLOGY | Facility: CLINIC | Age: 52
End: 2022-10-24
Payer: COMMERCIAL

## 2022-10-24 VITALS
HEIGHT: 66 IN | DIASTOLIC BLOOD PRESSURE: 77 MMHG | HEART RATE: 75 BPM | SYSTOLIC BLOOD PRESSURE: 116 MMHG | BODY MASS INDEX: 41.91 KG/M2 | WEIGHT: 260.81 LBS

## 2022-10-24 DIAGNOSIS — Z90.3 S/P GASTRIC SLEEVE PROCEDURE: ICD-10-CM

## 2022-10-24 DIAGNOSIS — I25.5 ISCHEMIC CARDIOMYOPATHY: ICD-10-CM

## 2022-10-24 DIAGNOSIS — I50.42 CHRONIC COMBINED SYSTOLIC AND DIASTOLIC HEART FAILURE: ICD-10-CM

## 2022-10-24 DIAGNOSIS — I25.2 OLD MYOCARDIAL INFARCTION: ICD-10-CM

## 2022-10-24 DIAGNOSIS — E88.810 METABOLIC SYNDROME: ICD-10-CM

## 2022-10-24 DIAGNOSIS — L40.9 PSORIASIS: ICD-10-CM

## 2022-10-24 DIAGNOSIS — E66.01 MORBID OBESITY: ICD-10-CM

## 2022-10-24 DIAGNOSIS — I25.10 CORONARY ARTERY DISEASE INVOLVING NATIVE CORONARY ARTERY OF NATIVE HEART WITHOUT ANGINA PECTORIS: Primary | ICD-10-CM

## 2022-10-24 DIAGNOSIS — E78.5 DYSLIPIDEMIA: ICD-10-CM

## 2022-10-24 PROCEDURE — 3074F SYST BP LT 130 MM HG: CPT | Mod: CPTII,S$GLB,, | Performed by: INTERNAL MEDICINE

## 2022-10-24 PROCEDURE — 1159F MED LIST DOCD IN RCRD: CPT | Mod: CPTII,S$GLB,, | Performed by: INTERNAL MEDICINE

## 2022-10-24 PROCEDURE — 99214 PR OFFICE/OUTPT VISIT, EST, LEVL IV, 30-39 MIN: ICD-10-PCS | Mod: S$GLB,,, | Performed by: INTERNAL MEDICINE

## 2022-10-24 PROCEDURE — 3078F PR MOST RECENT DIASTOLIC BLOOD PRESSURE < 80 MM HG: ICD-10-PCS | Mod: CPTII,S$GLB,, | Performed by: INTERNAL MEDICINE

## 2022-10-24 PROCEDURE — 1159F PR MEDICATION LIST DOCUMENTED IN MEDICAL RECORD: ICD-10-PCS | Mod: CPTII,S$GLB,, | Performed by: INTERNAL MEDICINE

## 2022-10-24 PROCEDURE — 3074F PR MOST RECENT SYSTOLIC BLOOD PRESSURE < 130 MM HG: ICD-10-PCS | Mod: CPTII,S$GLB,, | Performed by: INTERNAL MEDICINE

## 2022-10-24 PROCEDURE — 99999 PR PBB SHADOW E&M-EST. PATIENT-LVL III: ICD-10-PCS | Mod: PBBFAC,,, | Performed by: INTERNAL MEDICINE

## 2022-10-24 PROCEDURE — 4010F ACE/ARB THERAPY RXD/TAKEN: CPT | Mod: CPTII,S$GLB,, | Performed by: INTERNAL MEDICINE

## 2022-10-24 PROCEDURE — 99214 OFFICE O/P EST MOD 30 MIN: CPT | Mod: S$GLB,,, | Performed by: INTERNAL MEDICINE

## 2022-10-24 PROCEDURE — 1160F PR REVIEW ALL MEDS BY PRESCRIBER/CLIN PHARMACIST DOCUMENTED: ICD-10-PCS | Mod: CPTII,S$GLB,, | Performed by: INTERNAL MEDICINE

## 2022-10-24 PROCEDURE — 3044F HG A1C LEVEL LT 7.0%: CPT | Mod: CPTII,S$GLB,, | Performed by: INTERNAL MEDICINE

## 2022-10-24 PROCEDURE — 4010F PR ACE/ARB THEARPY RXD/TAKEN: ICD-10-PCS | Mod: CPTII,S$GLB,, | Performed by: INTERNAL MEDICINE

## 2022-10-24 PROCEDURE — 99999 PR PBB SHADOW E&M-EST. PATIENT-LVL III: CPT | Mod: PBBFAC,,, | Performed by: INTERNAL MEDICINE

## 2022-10-24 PROCEDURE — 3078F DIAST BP <80 MM HG: CPT | Mod: CPTII,S$GLB,, | Performed by: INTERNAL MEDICINE

## 2022-10-24 PROCEDURE — 1160F RVW MEDS BY RX/DR IN RCRD: CPT | Mod: CPTII,S$GLB,, | Performed by: INTERNAL MEDICINE

## 2022-10-24 PROCEDURE — 3044F PR MOST RECENT HEMOGLOBIN A1C LEVEL <7.0%: ICD-10-PCS | Mod: CPTII,S$GLB,, | Performed by: INTERNAL MEDICINE

## 2022-10-25 ENCOUNTER — PATIENT MESSAGE (OUTPATIENT)
Dept: DERMATOLOGY | Facility: CLINIC | Age: 52
End: 2022-10-25
Payer: COMMERCIAL

## 2022-10-25 ENCOUNTER — PATIENT MESSAGE (OUTPATIENT)
Dept: CARDIOLOGY | Facility: CLINIC | Age: 52
End: 2022-10-25
Payer: COMMERCIAL

## 2022-10-25 DIAGNOSIS — E78.5 DYSLIPIDEMIA: Primary | ICD-10-CM

## 2022-10-25 DIAGNOSIS — I25.5 ISCHEMIC CARDIOMYOPATHY: ICD-10-CM

## 2022-10-25 RX ORDER — SACUBITRIL AND VALSARTAN 24; 26 MG/1; MG/1
1 TABLET, FILM COATED ORAL 2 TIMES DAILY
COMMUNITY
End: 2022-10-25 | Stop reason: SDUPTHER

## 2022-10-25 RX ORDER — SACUBITRIL AND VALSARTAN 24; 26 MG/1; MG/1
1 TABLET, FILM COATED ORAL 2 TIMES DAILY
Qty: 60 TABLET | Refills: 3 | Status: SHIPPED | OUTPATIENT
Start: 2022-10-25 | End: 2022-11-09 | Stop reason: SDUPTHER

## 2022-10-25 NOTE — PROGRESS NOTES
Subjective:   Patient ID:  Oleksandr Dawkins is a 52 y.o. male who presents for follow-up of Follow-up and Coronary Artery Disease      HPI: Recent history: taking medications as instructed, no medication side effects noted, no TIA's, no chest pain on exertion, no dyspnea on exertion and no swelling of ankles. Patient's symptoms have been unchanged. No medication side effects.      Lab Results   Component Value Date     11/08/2022     11/08/2022    K 4.1 11/08/2022    K 4.1 11/08/2022     11/08/2022     11/08/2022    CO2 26 11/08/2022    CO2 26 11/08/2022    BUN 11 11/08/2022    BUN 11 11/08/2022    CREATININE 0.8 11/08/2022    CREATININE 0.8 11/08/2022    GLU 94 11/08/2022    GLU 94 11/08/2022    HGBA1C 5.4 10/18/2022    AST 18 11/08/2022    ALT 25 11/08/2022    ALBUMIN 3.8 11/08/2022    PROT 6.8 11/08/2022    BILITOT 0.8 11/08/2022    WBC 6.32 11/08/2022    HGB 15.9 11/08/2022    HCT 49.2 11/08/2022    MCV 88 11/08/2022     11/08/2022    TSH 2.468 10/18/2022    CHOL 233 (H) 11/08/2022    HDL 42 11/08/2022    LDLCALC 158.4 11/08/2022    TRIG 163 (H) 11/08/2022       No results found in the last 24 hours.     Review of Systems   Constitutional: Negative.   HENT: Negative.     Eyes: Negative.    Cardiovascular: Negative.  Negative for chest pain, claudication, dyspnea on exertion, irregular heartbeat, leg swelling, near-syncope, palpitations and syncope.   Respiratory: Negative.  Negative for cough, shortness of breath, snoring and wheezing.    Endocrine: Negative.  Negative for cold intolerance, heat intolerance, polydipsia, polyphagia and polyuria.   Skin: Negative.    Musculoskeletal: Negative.    Gastrointestinal: Negative.    Genitourinary: Negative.    Neurological: Negative.    Psychiatric/Behavioral: Negative.       Objective:   Physical Exam  Vitals and nursing note reviewed.   Constitutional:       Appearance: He is well-developed. He is obese.      Comments: /77   Pulse  "75   Ht 5' 6" (1.676 m)   Wt 118.3 kg (260 lb 12.9 oz)   BMI 42.09 kg/m²      HENT:      Head: Normocephalic.   Eyes:      Pupils: Pupils are equal, round, and reactive to light.   Neck:      Thyroid: No thyromegaly.      Vascular: No carotid bruit.   Cardiovascular:      Rate and Rhythm: Normal rate and regular rhythm.      Pulses: Intact distal pulses.           Carotid pulses are 2+ on the right side and 2+ on the left side.       Radial pulses are 2+ on the right side and 2+ on the left side.        Femoral pulses are 2+ on the right side and 2+ on the left side.       Popliteal pulses are 2+ on the right side and 2+ on the left side.        Dorsalis pedis pulses are 2+ on the right side and 2+ on the left side.        Posterior tibial pulses are 2+ on the right side and 2+ on the left side.      Heart sounds: Normal heart sounds. No murmur heard.    No friction rub. No gallop.   Pulmonary:      Effort: Pulmonary effort is normal. No respiratory distress.      Breath sounds: Normal breath sounds. No wheezing or rales.   Chest:      Chest wall: No tenderness.   Abdominal:      General: Bowel sounds are normal.      Palpations: Abdomen is soft.   Musculoskeletal:         General: Normal range of motion.      Cervical back: Normal range of motion and neck supple.   Skin:     General: Skin is warm and dry.   Neurological:      Mental Status: He is alert and oriented to person, place, and time.         Assessment and Plan:     Problem List Items Addressed This Visit          Cardiology Problems    Coronary artery disease involving native coronary artery without angina pectoris - Primary    Old myocardial infarction    Ischemic cardiomyopathy    Chronic combined systolic and diastolic heart failure       Other    Dyslipidemia    Morbid obesity    S/P gastric sleeve procedure    Metabolic syndrome    Psoriasis       Patient's Medications   New Prescriptions    No medications on file   Previous Medications    ASPIRIN " (ECOTRIN) 81 MG EC TABLET    Take 81 mg by mouth once daily.    METHYLPREDNISOLONE (MEDROL DOSEPACK) 4 MG TABLET    use as directed    ROSUVASTATIN (CRESTOR) 20 MG TABLET    TAKE 1 TABLET BY MOUTH EVERY DAY    STELARA 90 MG/ML SYRG SYRINGE    Inject into the skin every 3 (three) months.   Modified Medications    No medications on file   Discontinued Medications    VALSARTAN (DIOVAN) 40 MG TABLET    Take 1 tablet (40 mg total) by mouth once daily.     Will change Valsartan to lowest dose of Entresto and monitor BMP in 2 weeks.  Follow up in about 1 year (around 10/24/2023).

## 2022-10-26 ENCOUNTER — TELEPHONE (OUTPATIENT)
Dept: DERMATOLOGY | Facility: CLINIC | Age: 52
End: 2022-10-26
Payer: COMMERCIAL

## 2022-10-26 NOTE — TELEPHONE ENCOUNTER
Staff contacted pt regarding confirming virtual appointment on 10/27/22 with MD Bernard. pt appointment was confirmed.

## 2022-10-27 ENCOUNTER — OFFICE VISIT (OUTPATIENT)
Dept: DERMATOLOGY | Facility: CLINIC | Age: 52
End: 2022-10-27
Payer: COMMERCIAL

## 2022-10-27 DIAGNOSIS — L40.0 PSORIASIS VULGARIS: Primary | ICD-10-CM

## 2022-10-27 DIAGNOSIS — Z79.899 LONG-TERM USE OF HIGH-RISK MEDICATION: ICD-10-CM

## 2022-10-27 DIAGNOSIS — L40.50 PSORIATIC ARTHRITIS: ICD-10-CM

## 2022-10-27 PROCEDURE — 3044F HG A1C LEVEL LT 7.0%: CPT | Mod: CPTII,95,, | Performed by: DERMATOLOGY

## 2022-10-27 PROCEDURE — 4010F PR ACE/ARB THEARPY RXD/TAKEN: ICD-10-PCS | Mod: CPTII,95,, | Performed by: DERMATOLOGY

## 2022-10-27 PROCEDURE — 1160F PR REVIEW ALL MEDS BY PRESCRIBER/CLIN PHARMACIST DOCUMENTED: ICD-10-PCS | Mod: CPTII,95,, | Performed by: DERMATOLOGY

## 2022-10-27 PROCEDURE — 3044F PR MOST RECENT HEMOGLOBIN A1C LEVEL <7.0%: ICD-10-PCS | Mod: CPTII,95,, | Performed by: DERMATOLOGY

## 2022-10-27 PROCEDURE — 99204 PR OFFICE/OUTPT VISIT, NEW, LEVL IV, 45-59 MIN: ICD-10-PCS | Mod: 95,,, | Performed by: DERMATOLOGY

## 2022-10-27 PROCEDURE — 1160F RVW MEDS BY RX/DR IN RCRD: CPT | Mod: CPTII,95,, | Performed by: DERMATOLOGY

## 2022-10-27 PROCEDURE — 1159F MED LIST DOCD IN RCRD: CPT | Mod: CPTII,95,, | Performed by: DERMATOLOGY

## 2022-10-27 PROCEDURE — 99204 OFFICE O/P NEW MOD 45 MIN: CPT | Mod: 95,,, | Performed by: DERMATOLOGY

## 2022-10-27 PROCEDURE — 4010F ACE/ARB THERAPY RXD/TAKEN: CPT | Mod: CPTII,95,, | Performed by: DERMATOLOGY

## 2022-10-27 PROCEDURE — 1159F PR MEDICATION LIST DOCUMENTED IN MEDICAL RECORD: ICD-10-PCS | Mod: CPTII,95,, | Performed by: DERMATOLOGY

## 2022-10-27 RX ORDER — SECUKINUMAB 150 MG/ML
INJECTION SUBCUTANEOUS
Qty: 10 EACH | Refills: 0 | Status: SHIPPED | OUTPATIENT
Start: 2022-10-27 | End: 2023-04-21

## 2022-10-27 RX ORDER — SECUKINUMAB 150 MG/ML
INJECTION SUBCUTANEOUS
Qty: 2 EACH | Refills: 2 | Status: SHIPPED | OUTPATIENT
Start: 2022-10-27 | End: 2023-04-21

## 2022-10-27 NOTE — PROGRESS NOTES
The patient location is: home  The chief complaint leading to consultation is: psoriasis  Visit type: virtual visit with synchronous audio and video  Total time spent with patient: 22 minutes  Each patient to whom I provide medical services by telemedicine is:  (1) informed of the relationship between the physician and patient and the respective role of any other health care provider with respect to management of the patient; and (2) notified that he or she may decline to receive medical services by telemedicine and may withdraw from such care at any time.      Patient Information  Name: Oleksandr Dawkins  : 1970  MRN: [id number]     Referring Physician:  No ref. provider found   Primary Care Physician:  Primary Doctor No   Date of Visit: 10/27/2022      Subjective:     History of Present lllness:    Oleksandr Dawkins is a 51 y.o. male who presents with a chief complaint of psoriasis.  Location: scalp, elbows, knees, abdomen, back, buttock  Duration: since age 14  Signs/Symptoms: thick scales if not treated; cracking, crusting, redness, scaling,   Relieving factors/Prior treatments: his skin has been clear with no flare ups for the past 2 years while on Stelara; previously tried and failed oral and topical steroids  (He was previously in a study with Dr. Clifford Alegre in  for generic Stelara.)  Last TB test was 2022  Joint pain in hands has worsened over the past 5 years. Prior hand and foot x-rays show PsA.    Clinical documentation obtained by nursing staff reviewed.    Review of Systems   Constitutional:  Negative for fever, fatigue and malaise.   Respiratory:  Negative for cough and shortness of breath.    Gastrointestinal:  Negative for nausea, vomiting and diarrhea.   Musculoskeletal:  Positive for arthralgias (hand and foot x-rays show PsA). Negative for joint swelling.   Skin:  Negative for abscesses.     Objective:   Physical Exam   Constitutional: He appears well-developed and well-nourished. No  distress.   Neurological: He is alert and oriented to person, place, and time. He is not disoriented.   Psychiatric: He has a normal mood and affect.            [] Data reviewed  [] Prior external notes reviewed  [] Independent review of test  [] Management discussed with another provider  [] Independent historian    Assessment / Plan:        Psoriasis vulgaris  Psoriatic arthritis  Long-term use of high-risk medication  - chronic problem, not at treatment goal  Given the progression/refractory nature of his psoriatic arthritis while on Stelara, will change to anti-IL17 therapy.   Discussed benefits and risks of secukinumab (Cosentyx) today, including but not limited to injection site reactions, increased risk of infections especially candidiasis/tinea, reactivation of tuberculosis, and new onset inflammatory bowel disease. Patient is to call with any side effects. Patient should discontinue Cosentyx and notify our office if an infection develops. Live vaccines should be avoided while on this medication.    Check hepatitis panel, HIV, and Quant gold, prior to initiating. Will need CBC and LFTs q3mo x 2, then q3-6mo. Will obtain yearly quantiferon gold.   Start secukinumab 300 mg SC qwk x 5wk, then 300 mg SC q4wk.    -     Comprehensive Metabolic Panel; Future; Expected date: 10/27/2022  -     CBC Auto Differential; Future; Expected date: 10/27/2022  -     Quantiferon Gold TB; Future; Expected date: 10/27/2022  -     COSENTYX PEN, 2 PENS, 150 mg/mL PnIj; Inject 300mg SQ qweek x 5 weeks then inject 300mg SQ q 4 weeks  Dispense: 10 each; Refill: 0  -     COSENTYX PEN, 2 PENS, 150 mg/mL PnIj; Inject 300mg (2 pens) SQ q 4 weeks  Dispense: 2 each; Refill: 2  -     Hepatitis C Antibody; Future; Expected date: 10/27/2022  -     Hepatitis B Core Antibody, Total; Future; Expected date: 10/27/2022  -     Hepatitis B Surface Antigen; Future; Expected date: 10/27/2022  -     Hepatitis B Surface Ab, Qualitative; Future; Expected  date: 10/27/2022  -     HIV 1/2 Ag/Ab (4th Gen); Future; Expected date: 10/27/2022    Follow up in about 3 months (around 1/27/2023) for follow up.      Marce Falcon MD, FAAD  [facility]

## 2022-10-31 ENCOUNTER — TELEPHONE (OUTPATIENT)
Dept: DERMATOLOGY | Facility: CLINIC | Age: 52
End: 2022-10-31
Payer: COMMERCIAL

## 2022-10-31 NOTE — TELEPHONE ENCOUNTER
----- Message from Radha Feliciano sent at 10/31/2022  4:28 PM CDT -----  Regarding: Returning call  Pt is requesting a Laurent back from Leonard please call to discuss further .  Attempt to Call Leonard No response       Pt @ [phone]

## 2022-11-02 ENCOUNTER — SPECIALTY PHARMACY (OUTPATIENT)
Dept: PHARMACY | Facility: CLINIC | Age: 52
End: 2022-11-02
Payer: COMMERCIAL

## 2022-11-02 NOTE — TELEPHONE ENCOUNTER
Incoming call back from patient regarding Cosentyx Rx. Patient advised that Rx has been received but he must complete pending lab orders before [facility] can proceed with Rx. Patient states he will get with [facility] to schedule lab appt.

## 2022-11-02 NOTE — TELEPHONE ENCOUNTER
Altagracia, this is Airam Contreras with [facility].  We are working on your prescription that your doctor has sent us. We will be working with your insurance to get this approved for you. We will be calling you along the way with updates on your medication.  If you have any questions, you can reach us at [phone].    Welcome call outcome: Left voicemail    Patient needs to have labs drawn prior to start of Cosentyx.

## 2022-11-05 ENCOUNTER — PATIENT MESSAGE (OUTPATIENT)
Dept: CARDIOLOGY | Facility: CLINIC | Age: 52
End: 2022-11-05
Payer: COMMERCIAL

## 2022-11-07 NOTE — TELEPHONE ENCOUNTER
Outgoing call to patient , informed patient of denial and plans to appeal, patient repeated understanding. Patient states his next Stelara dose is due in Dec.

## 2022-11-08 ENCOUNTER — LAB VISIT (OUTPATIENT)
Dept: LAB | Facility: HOSPITAL | Age: 52
End: 2022-11-08
Attending: DERMATOLOGY
Payer: COMMERCIAL

## 2022-11-08 DIAGNOSIS — I25.10 CORONARY ARTERY DISEASE INVOLVING NATIVE CORONARY ARTERY OF NATIVE HEART WITHOUT ANGINA PECTORIS: ICD-10-CM

## 2022-11-08 DIAGNOSIS — I25.5 ISCHEMIC CARDIOMYOPATHY: ICD-10-CM

## 2022-11-08 DIAGNOSIS — E78.5 DYSLIPIDEMIA: ICD-10-CM

## 2022-11-08 DIAGNOSIS — Z79.899 LONG-TERM USE OF HIGH-RISK MEDICATION: ICD-10-CM

## 2022-11-08 DIAGNOSIS — L40.0 PSORIASIS VULGARIS: ICD-10-CM

## 2022-11-08 DIAGNOSIS — L40.50 PSORIATIC ARTHRITIS: ICD-10-CM

## 2022-11-08 LAB
ALBUMIN SERPL BCP-MCNC: 3.8 G/DL (ref 3.5–5.2)
ALP SERPL-CCNC: 74 U/L (ref 55–135)
ALT SERPL W/O P-5'-P-CCNC: 25 U/L (ref 10–44)
ANION GAP SERPL CALC-SCNC: 8 MMOL/L (ref 8–16)
ANION GAP SERPL CALC-SCNC: 8 MMOL/L (ref 8–16)
AST SERPL-CCNC: 18 U/L (ref 10–40)
BASOPHILS # BLD AUTO: 0.05 K/UL (ref 0–0.2)
BASOPHILS NFR BLD: 0.8 % (ref 0–1.9)
BILIRUB SERPL-MCNC: 0.8 MG/DL (ref 0.1–1)
BUN SERPL-MCNC: 11 MG/DL (ref 6–20)
BUN SERPL-MCNC: 11 MG/DL (ref 6–20)
CALCIUM SERPL-MCNC: 9.5 MG/DL (ref 8.7–10.5)
CALCIUM SERPL-MCNC: 9.5 MG/DL (ref 8.7–10.5)
CHLORIDE SERPL-SCNC: 104 MMOL/L (ref 95–110)
CHLORIDE SERPL-SCNC: 104 MMOL/L (ref 95–110)
CHOLEST SERPL-MCNC: 233 MG/DL (ref 120–199)
CHOLEST/HDLC SERPL: 5.5 {RATIO} (ref 2–5)
CO2 SERPL-SCNC: 26 MMOL/L (ref 23–29)
CO2 SERPL-SCNC: 26 MMOL/L (ref 23–29)
CREAT SERPL-MCNC: 0.8 MG/DL (ref 0.5–1.4)
CREAT SERPL-MCNC: 0.8 MG/DL (ref 0.5–1.4)
DIFFERENTIAL METHOD: ABNORMAL
EOSINOPHIL # BLD AUTO: 0.2 K/UL (ref 0–0.5)
EOSINOPHIL NFR BLD: 3.5 % (ref 0–8)
ERYTHROCYTE [DISTWIDTH] IN BLOOD BY AUTOMATED COUNT: 12.5 % (ref 11.5–14.5)
EST. GFR  (NO RACE VARIABLE): >60 ML/MIN/1.73 M^2
EST. GFR  (NO RACE VARIABLE): >60 ML/MIN/1.73 M^2
GLUCOSE SERPL-MCNC: 94 MG/DL (ref 70–110)
GLUCOSE SERPL-MCNC: 94 MG/DL (ref 70–110)
HBV CORE AB SERPL QL IA: NORMAL
HBV SURFACE AB SER-ACNC: <3 MIU/ML
HBV SURFACE AB SER-ACNC: NORMAL M[IU]/ML
HBV SURFACE AG SERPL QL IA: NORMAL
HCT VFR BLD AUTO: 49.2 % (ref 40–54)
HCV AB SERPL QL IA: NORMAL
HDLC SERPL-MCNC: 42 MG/DL (ref 40–75)
HDLC SERPL: 18 % (ref 20–50)
HGB BLD-MCNC: 15.9 G/DL (ref 14–18)
HIV 1+2 AB+HIV1 P24 AG SERPL QL IA: NORMAL
IMM GRANULOCYTES # BLD AUTO: 0.05 K/UL (ref 0–0.04)
IMM GRANULOCYTES NFR BLD AUTO: 0.8 % (ref 0–0.5)
LDLC SERPL CALC-MCNC: 158.4 MG/DL (ref 63–159)
LYMPHOCYTES # BLD AUTO: 2.4 K/UL (ref 1–4.8)
LYMPHOCYTES NFR BLD: 37.7 % (ref 18–48)
MCH RBC QN AUTO: 28.3 PG (ref 27–31)
MCHC RBC AUTO-ENTMCNC: 32.3 G/DL (ref 32–36)
MCV RBC AUTO: 88 FL (ref 82–98)
MONOCYTES # BLD AUTO: 0.5 K/UL (ref 0.3–1)
MONOCYTES NFR BLD: 7.8 % (ref 4–15)
NEUTROPHILS # BLD AUTO: 3.1 K/UL (ref 1.8–7.7)
NEUTROPHILS NFR BLD: 49.4 % (ref 38–73)
NONHDLC SERPL-MCNC: 191 MG/DL
NRBC BLD-RTO: 0 /100 WBC
PLATELET # BLD AUTO: 191 K/UL (ref 150–450)
PMV BLD AUTO: 11.2 FL (ref 9.2–12.9)
POTASSIUM SERPL-SCNC: 4.1 MMOL/L (ref 3.5–5.1)
POTASSIUM SERPL-SCNC: 4.1 MMOL/L (ref 3.5–5.1)
PROT SERPL-MCNC: 6.8 G/DL (ref 6–8.4)
RBC # BLD AUTO: 5.61 M/UL (ref 4.6–6.2)
SODIUM SERPL-SCNC: 138 MMOL/L (ref 136–145)
SODIUM SERPL-SCNC: 138 MMOL/L (ref 136–145)
TRIGL SERPL-MCNC: 163 MG/DL (ref 30–150)
WBC # BLD AUTO: 6.32 K/UL (ref 3.9–12.7)

## 2022-11-08 PROCEDURE — 86480 TB TEST CELL IMMUN MEASURE: CPT | Performed by: DERMATOLOGY

## 2022-11-08 PROCEDURE — 86704 HEP B CORE ANTIBODY TOTAL: CPT | Performed by: DERMATOLOGY

## 2022-11-08 PROCEDURE — 86706 HEP B SURFACE ANTIBODY: CPT | Mod: 91 | Performed by: DERMATOLOGY

## 2022-11-08 PROCEDURE — 85025 COMPLETE CBC W/AUTO DIFF WBC: CPT | Performed by: DERMATOLOGY

## 2022-11-08 PROCEDURE — 80061 LIPID PANEL: CPT | Performed by: INTERNAL MEDICINE

## 2022-11-08 PROCEDURE — 36415 COLL VENOUS BLD VENIPUNCTURE: CPT | Mod: PO | Performed by: DERMATOLOGY

## 2022-11-08 PROCEDURE — 80053 COMPREHEN METABOLIC PANEL: CPT | Performed by: DERMATOLOGY

## 2022-11-08 PROCEDURE — 87389 HIV-1 AG W/HIV-1&-2 AB AG IA: CPT | Performed by: DERMATOLOGY

## 2022-11-08 PROCEDURE — 86803 HEPATITIS C AB TEST: CPT | Performed by: DERMATOLOGY

## 2022-11-08 PROCEDURE — 87340 HEPATITIS B SURFACE AG IA: CPT | Performed by: DERMATOLOGY

## 2022-11-09 ENCOUNTER — TELEPHONE (OUTPATIENT)
Dept: CARDIOLOGY | Facility: CLINIC | Age: 52
End: 2022-11-09
Payer: COMMERCIAL

## 2022-11-09 DIAGNOSIS — I25.5 ISCHEMIC CARDIOMYOPATHY: ICD-10-CM

## 2022-11-09 PROBLEM — I50.42 CHRONIC COMBINED SYSTOLIC AND DIASTOLIC HEART FAILURE: Status: ACTIVE | Noted: 2022-11-09

## 2022-11-09 LAB
GAMMA INTERFERON BACKGROUND BLD IA-ACNC: 0.1 IU/ML
M TB IFN-G CD4+ BCKGRND COR BLD-ACNC: -0.03 IU/ML
MITOGEN IGNF BCKGRD COR BLD-ACNC: 9.9 IU/ML
TB GOLD PLUS: NEGATIVE
TB2 - NIL: -0.03 IU/ML

## 2022-11-09 RX ORDER — SACUBITRIL AND VALSARTAN 24; 26 MG/1; MG/1
1 TABLET, FILM COATED ORAL 2 TIMES DAILY
Qty: 60 TABLET | Refills: 3 | Status: SHIPPED | OUTPATIENT
Start: 2022-11-09 | End: 2023-09-13 | Stop reason: SDUPTHER

## 2022-11-09 NOTE — PROGRESS NOTES
Please inform the patient that his lipid panel has gotten much worse.  If he is not taking Crestor anymore he should either restart or we can try subcutaneous ( injectable medication.  It is imperative he takes medication for cholesterol issues.

## 2022-11-09 NOTE — TELEPHONE ENCOUNTER
----- Message from Janet Mascorro MD sent at 11/9/2022  8:03 AM CST -----  Please inform the patient that his lipid panel has gotten much worse.  If he is not taking Crestor anymore he should either restart or we can try subcutaneous ( injectable medication.  It is imperative he takes medication for cholesterol issues.

## 2022-11-09 NOTE — TELEPHONE ENCOUNTER
-Continue home regimen of Valium, Cymbalta and Latuda  -Psychiatry consult appreciated    -medication changes made see goals of care counseling Pt notified,verbalized understanding....

## 2022-11-17 ENCOUNTER — TELEPHONE (OUTPATIENT)
Dept: DERMATOLOGY | Facility: CLINIC | Age: 52
End: 2022-11-17
Payer: COMMERCIAL

## 2022-11-17 NOTE — TELEPHONE ENCOUNTER
Spoke with [facility] they are handling the PA for Cosentyx. I have faxed over the denial letter that was received over fax----- Message from Leonard Peña MA sent at 11/16/2022  3:21 PM CST -----  Regarding: FW: appeal info  Contact: [phone] nena  Yes it is!    ----- Message -----  From: Maggy Chiang LPN  Sent: 11/15/2022   3:40 PM CST  To: Leonard Peña MA  Subject: FW: appeal info                                  Can you remind me if this is one you are working on?  ----- Message -----  From: Ange Gill  Sent: 11/15/2022   1:57 PM CST  To: Terrie Zheng Staff  Subject: appeal info                                      Nena w/ [employer] calling want to know if you rec'd appeal info that faxed over on 11/4/22 to fax # [phone]. also you can let her know if you need help with completing it.   Call back [phone]

## 2022-11-17 NOTE — TELEPHONE ENCOUNTER
Maxine from Dr. Falcon's office calling to inquire if they need to contact [facility] for appeal. Notes show that [facility] is currently working on appeal. Provided [facility] fax so they may fax letter if needed. Maxine or Leonard can be contacted with questions/updates. Routing to assigned [facility].

## 2022-11-22 ENCOUNTER — TELEPHONE (OUTPATIENT)
Dept: PHARMACY | Facility: CLINIC | Age: 52
End: 2022-11-22
Payer: COMMERCIAL

## 2022-11-29 ENCOUNTER — PATIENT MESSAGE (OUTPATIENT)
Dept: PHARMACY | Facility: CLINIC | Age: 52
End: 2022-11-29
Payer: COMMERCIAL

## 2022-11-29 ENCOUNTER — TELEPHONE (OUTPATIENT)
Dept: PHARMACY | Facility: CLINIC | Age: 52
End: 2022-11-29
Payer: COMMERCIAL

## 2022-11-29 NOTE — TELEPHONE ENCOUNTER
Outgoing call to [facility], appeal has been overturned , approved from 11/25/22 to 11/25/23, reference number [id number].     Patient must fill with [facility], routing script to [facility], patient can call [phone] to follow up with order. Patient should also enroll into Cosentyx copay card, sending Milot message. Closing referral at [facility].    Outgoing call to patient, MATILDA.

## 2022-11-30 ENCOUNTER — TELEPHONE (OUTPATIENT)
Dept: CARDIOLOGY | Facility: CLINIC | Age: 52
End: 2022-11-30
Payer: COMMERCIAL

## 2022-11-30 ENCOUNTER — PATIENT MESSAGE (OUTPATIENT)
Dept: CARDIOLOGY | Facility: CLINIC | Age: 52
End: 2022-11-30
Payer: COMMERCIAL

## 2022-12-12 ENCOUNTER — PATIENT MESSAGE (OUTPATIENT)
Dept: UROLOGY | Facility: CLINIC | Age: 52
End: 2022-12-12
Payer: COMMERCIAL

## 2022-12-13 ENCOUNTER — OFFICE VISIT (OUTPATIENT)
Dept: UROLOGY | Facility: CLINIC | Age: 52
End: 2022-12-13
Payer: COMMERCIAL

## 2022-12-13 VITALS
SYSTOLIC BLOOD PRESSURE: 136 MMHG | HEIGHT: 66 IN | DIASTOLIC BLOOD PRESSURE: 86 MMHG | WEIGHT: 255.69 LBS | BODY MASS INDEX: 41.09 KG/M2 | HEART RATE: 87 BPM

## 2022-12-13 DIAGNOSIS — Z12.5 PROSTATE CANCER SCREENING: ICD-10-CM

## 2022-12-13 DIAGNOSIS — N45.3 EPIDIDYMOORCHITIS: ICD-10-CM

## 2022-12-13 DIAGNOSIS — N50.89 SWOLLEN TESTICLE: Primary | ICD-10-CM

## 2022-12-13 LAB
BILIRUB SERPL-MCNC: NORMAL MG/DL
BLOOD URINE, POC: NORMAL
CLARITY, POC UA: CLEAR
COLOR, POC UA: YELLOW
GLUCOSE UR QL STRIP: NORMAL
KETONES UR QL STRIP: NORMAL
LEUKOCYTE ESTERASE URINE, POC: NORMAL
NITRITE, POC UA: NORMAL
PH, POC UA: 7
PROTEIN, POC: NORMAL
SPECIFIC GRAVITY, POC UA: 1
UROBILINOGEN, POC UA: NORMAL

## 2022-12-13 PROCEDURE — 4010F ACE/ARB THERAPY RXD/TAKEN: CPT | Mod: CPTII,S$GLB,,

## 2022-12-13 PROCEDURE — 99214 PR OFFICE/OUTPT VISIT, EST, LEVL IV, 30-39 MIN: ICD-10-PCS | Mod: S$GLB,,,

## 2022-12-13 PROCEDURE — 99214 OFFICE O/P EST MOD 30 MIN: CPT | Mod: S$GLB,,,

## 2022-12-13 PROCEDURE — 3008F BODY MASS INDEX DOCD: CPT | Mod: CPTII,S$GLB,,

## 2022-12-13 PROCEDURE — 81002 URINALYSIS NONAUTO W/O SCOPE: CPT | Mod: S$GLB,,,

## 2022-12-13 PROCEDURE — 4010F PR ACE/ARB THEARPY RXD/TAKEN: ICD-10-PCS | Mod: CPTII,S$GLB,,

## 2022-12-13 PROCEDURE — 3075F SYST BP GE 130 - 139MM HG: CPT | Mod: CPTII,S$GLB,,

## 2022-12-13 PROCEDURE — 1159F PR MEDICATION LIST DOCUMENTED IN MEDICAL RECORD: ICD-10-PCS | Mod: CPTII,S$GLB,,

## 2022-12-13 PROCEDURE — 81002 POCT URINE DIPSTICK WITHOUT MICROSCOPE: ICD-10-PCS | Mod: S$GLB,,,

## 2022-12-13 PROCEDURE — 99999 PR PBB SHADOW E&M-EST. PATIENT-LVL IV: CPT | Mod: PBBFAC,,,

## 2022-12-13 PROCEDURE — 3075F PR MOST RECENT SYSTOLIC BLOOD PRESS GE 130-139MM HG: ICD-10-PCS | Mod: CPTII,S$GLB,,

## 2022-12-13 PROCEDURE — 3044F PR MOST RECENT HEMOGLOBIN A1C LEVEL <7.0%: ICD-10-PCS | Mod: CPTII,S$GLB,,

## 2022-12-13 PROCEDURE — 1159F MED LIST DOCD IN RCRD: CPT | Mod: CPTII,S$GLB,,

## 2022-12-13 PROCEDURE — 3044F HG A1C LEVEL LT 7.0%: CPT | Mod: CPTII,S$GLB,,

## 2022-12-13 PROCEDURE — 3079F PR MOST RECENT DIASTOLIC BLOOD PRESSURE 80-89 MM HG: ICD-10-PCS | Mod: CPTII,S$GLB,,

## 2022-12-13 PROCEDURE — 1160F RVW MEDS BY RX/DR IN RCRD: CPT | Mod: CPTII,S$GLB,,

## 2022-12-13 PROCEDURE — 3079F DIAST BP 80-89 MM HG: CPT | Mod: CPTII,S$GLB,,

## 2022-12-13 PROCEDURE — 99999 PR PBB SHADOW E&M-EST. PATIENT-LVL IV: ICD-10-PCS | Mod: PBBFAC,,,

## 2022-12-13 PROCEDURE — 1160F PR REVIEW ALL MEDS BY PRESCRIBER/CLIN PHARMACIST DOCUMENTED: ICD-10-PCS | Mod: CPTII,S$GLB,,

## 2022-12-13 PROCEDURE — 3008F PR BODY MASS INDEX (BMI) DOCUMENTED: ICD-10-PCS | Mod: CPTII,S$GLB,,

## 2022-12-13 RX ORDER — LEVOFLOXACIN 500 MG/1
500 TABLET, FILM COATED ORAL DAILY
Qty: 10 TABLET | Refills: 0 | Status: SHIPPED | OUTPATIENT
Start: 2022-12-13 | End: 2022-12-23

## 2022-12-13 NOTE — PATIENT INSTRUCTIONS
Good scrotal support - wear jock strap over underwear   Use ice for pain as needed - barrier between skin and ice pack, 10 min on, 50 min off intermittently  NSAIDS for pain as needed for 14 days - risk of gastric ulcers with nsaids and instructed patient to take with food. Ok to rotate with Tylenol.  OTC Voltaren gel for pain relief   Elevate scrotum at night with rolled dish towel at least for 1 hour   No heavy lifting over 10 lbs for 5 days

## 2022-12-13 NOTE — PROGRESS NOTES
CHIEF COMPLAINT:  R swollen testicle    HISTORY OF PRESENTING ILLINESS:  Oleksandr Dawkins is a 52 y.o. male new to urology. PMHx includes MI, UTI, vasectomy. Here today due to R swollen testicle that began Sunday night. States he woke up Sunday night to urinate and had some R sided testicular discomfort. When he awoke Monday AM his R testicle was swollen, red and painful to the touch. No concern for STI, no issues with urination, no fever or chills.         REVIEW OF SYSTEMS:  Review of Systems   Constitutional:  Negative for chills and fever.   HENT:  Negative for congestion and sore throat.    Respiratory:  Negative for cough and shortness of breath.    Cardiovascular:  Negative for chest pain and palpitations.   Gastrointestinal:  Negative for nausea and vomiting.   Genitourinary:  Negative for dysuria, flank pain, frequency, hematuria and urgency.   Neurological:  Negative for dizziness and headaches.       PATIENT HISTORY:    Past Medical History:   Diagnosis Date    Heart attack     Hx of vasectomy 2007    MI (myocardial infarction) 2007    1 stent placed        Past Surgical History:   Procedure Laterality Date    CHOLECYSTECTOMY      CORONARY ANGIOPLASTY WITH STENT PLACEMENT      gastric sleeve         Family History   Problem Relation Age of Onset    Hyperlipidemia Mother     Hypertension Mother     Heart attack Father     Heart disease Father     Hyperlipidemia Father     Hypertension Father     Heart attack Paternal Uncle     Heart disease Paternal Uncle     Hyperlipidemia Maternal Grandmother     Heart attack Maternal Grandfather     Heart disease Maternal Grandfather     Hyperlipidemia Maternal Grandfather     Hyperlipidemia Paternal Grandmother     Heart attack Paternal Grandfather     Heart disease Paternal Grandfather     Heart failure Paternal Grandfather     Hyperlipidemia Paternal Grandfather     Stroke Neg Hx        Social History     Socioeconomic History    Marital status:    Tobacco Use     Smoking status: Never    Smokeless tobacco: Never   Substance and Sexual Activity    Alcohol use: Yes     Comment: occ       Allergies:  Penicillins    Medications:    Current Outpatient Medications:     aspirin (ECOTRIN) 81 MG EC tablet, Take 81 mg by mouth once daily., Disp: , Rfl:     COSENTYX PEN, 2 PENS, 150 mg/mL PnIj, Inject 300mg SQ qweek x 5 weeks then inject 300mg SQ q 4 weeks, Disp: 10 each, Rfl: 0    COSENTYX PEN, 2 PENS, 150 mg/mL PnIj, Inject 300mg (2 pens) SQ q 4 weeks, Disp: 2 each, Rfl: 2    rosuvastatin (CRESTOR) 20 MG tablet, TAKE 1 TABLET BY MOUTH EVERY DAY, Disp: 90 tablet, Rfl: 2    sacubitriL-valsartan (ENTRESTO) 24-26 mg per tablet, Take 1 tablet by mouth 2 (two) times daily., Disp: 60 tablet, Rfl: 3    STELARA 90 mg/mL Syrg syringe, Inject into the skin every 3 (three) months., Disp: , Rfl:     levoFLOXacin (LEVAQUIN) 500 MG tablet, Take 1 tablet (500 mg total) by mouth once daily. for 10 days, Disp: 10 tablet, Rfl: 0    methylPREDNISolone (MEDROL DOSEPACK) 4 mg tablet, use as directed, Disp: 1 each, Rfl: 0    PHYSICAL EXAMINATION:  Physical Exam  Constitutional:       Appearance: Normal appearance.   HENT:      Head: Normocephalic and atraumatic.      Right Ear: External ear normal.      Left Ear: External ear normal.   Pulmonary:      Effort: Pulmonary effort is normal. No respiratory distress.   Genitourinary:     Prostate: Enlarged. Not tender and no nodules present.      Rectum: Normal.   Skin:     General: Skin is warm and dry.   Neurological:      General: No focal deficit present.      Mental Status: He is alert and oriented to person, place, and time.   Psychiatric:         Mood and Affect: Mood normal.         Behavior: Behavior normal.           IMPRESSION:  Encounter Diagnoses   Name Primary?    Swollen testicle Yes    Epididymoorchitis          Assessment:       1. Swollen testicle    2. Epididymoorchitis          Plan:   - 10 day course of levofloxacin sent to pharmacy of  choice  - testicle pain instructions attached to AVS and discussed in office     Follow up in 3 months with PSA prior     I spent 30 minutes with the patient of which more than half was spent in direct consultation with the patient in regards to our treatment and plan.  We addressed the office findings and recent labs.   Education and recommendations of today's plan of care including home remedies and needed follow up with PCP.   We discussed the chief complaint/LUTS and the possible contributory factors.   Recommended lifestyle modifications with proper, healthy diet, good hydration if no fluid restrictions; reducing bladder irritants.   Benefits of regular exercise approved by PCP.

## 2022-12-15 ENCOUNTER — PATIENT MESSAGE (OUTPATIENT)
Dept: UROLOGY | Facility: CLINIC | Age: 52
End: 2022-12-15
Payer: COMMERCIAL

## 2022-12-16 ENCOUNTER — PATIENT MESSAGE (OUTPATIENT)
Dept: DERMATOLOGY | Facility: CLINIC | Age: 52
End: 2022-12-16
Payer: COMMERCIAL

## 2023-01-15 ENCOUNTER — PATIENT MESSAGE (OUTPATIENT)
Dept: DERMATOLOGY | Facility: CLINIC | Age: 53
End: 2023-01-15
Payer: COMMERCIAL

## 2023-01-17 ENCOUNTER — IMMUNIZATION (OUTPATIENT)
Dept: INTERNAL MEDICINE | Facility: CLINIC | Age: 53
End: 2023-01-17
Payer: COMMERCIAL

## 2023-01-17 DIAGNOSIS — Z23 NEED FOR VACCINATION: Primary | ICD-10-CM

## 2023-01-17 PROCEDURE — 91312 COVID-19, MRNA, LNP-S, BIVALENT BOOSTER, PF, 30 MCG/0.3 ML DOSE: CPT | Mod: S$GLB,,, | Performed by: INTERNAL MEDICINE

## 2023-01-17 PROCEDURE — 91312 COVID-19, MRNA, LNP-S, BIVALENT BOOSTER, PF, 30 MCG/0.3 ML DOSE: ICD-10-PCS | Mod: S$GLB,,, | Performed by: INTERNAL MEDICINE

## 2023-01-17 PROCEDURE — 0124A COVID-19, MRNA, LNP-S, BIVALENT BOOSTER, PF, 30 MCG/0.3 ML DOSE: CPT | Mod: CV19,PBBFAC | Performed by: INTERNAL MEDICINE

## 2023-03-13 ENCOUNTER — LAB VISIT (OUTPATIENT)
Dept: LAB | Facility: HOSPITAL | Age: 53
End: 2023-03-13
Payer: COMMERCIAL

## 2023-03-13 DIAGNOSIS — Z12.5 PROSTATE CANCER SCREENING: ICD-10-CM

## 2023-03-13 LAB — COMPLEXED PSA SERPL-MCNC: 0.93 NG/ML (ref 0–4)

## 2023-03-13 PROCEDURE — 36415 COLL VENOUS BLD VENIPUNCTURE: CPT

## 2023-03-13 PROCEDURE — 84153 ASSAY OF PSA TOTAL: CPT

## 2023-04-11 DIAGNOSIS — L40.50 PSORIATIC ARTHRITIS: ICD-10-CM

## 2023-04-11 DIAGNOSIS — Z79.899 LONG-TERM USE OF HIGH-RISK MEDICATION: ICD-10-CM

## 2023-04-11 DIAGNOSIS — L40.0 PSORIASIS VULGARIS: ICD-10-CM

## 2023-04-13 ENCOUNTER — PATIENT MESSAGE (OUTPATIENT)
Dept: DERMATOLOGY | Facility: CLINIC | Age: 53
End: 2023-04-13
Payer: COMMERCIAL

## 2023-04-13 RX ORDER — SECUKINUMAB 150 MG/ML
INJECTION SUBCUTANEOUS
Qty: 2 EACH | Refills: 1 | OUTPATIENT
Start: 2023-04-13

## 2023-04-13 NOTE — TELEPHONE ENCOUNTER
LVM explaining to pt that he will need an appointment before receiving any refills on biologic. Asked him to call back to schedule if he intends on continuing the med

## 2023-04-21 ENCOUNTER — OFFICE VISIT (OUTPATIENT)
Dept: DERMATOLOGY | Facility: CLINIC | Age: 53
End: 2023-04-21
Payer: COMMERCIAL

## 2023-04-21 DIAGNOSIS — L40.50 PSORIATIC ARTHRITIS: ICD-10-CM

## 2023-04-21 DIAGNOSIS — L40.0 PSORIASIS VULGARIS: Primary | ICD-10-CM

## 2023-04-21 DIAGNOSIS — Z79.899 LONG-TERM USE OF HIGH-RISK MEDICATION: ICD-10-CM

## 2023-04-21 PROCEDURE — 4010F ACE/ARB THERAPY RXD/TAKEN: CPT | Mod: CPTII,S$GLB,, | Performed by: DERMATOLOGY

## 2023-04-21 PROCEDURE — 99214 OFFICE O/P EST MOD 30 MIN: CPT | Mod: S$GLB,,, | Performed by: DERMATOLOGY

## 2023-04-21 PROCEDURE — 1159F MED LIST DOCD IN RCRD: CPT | Mod: CPTII,S$GLB,, | Performed by: DERMATOLOGY

## 2023-04-21 PROCEDURE — 1160F PR REVIEW ALL MEDS BY PRESCRIBER/CLIN PHARMACIST DOCUMENTED: ICD-10-PCS | Mod: CPTII,S$GLB,, | Performed by: DERMATOLOGY

## 2023-04-21 PROCEDURE — 4010F PR ACE/ARB THEARPY RXD/TAKEN: ICD-10-PCS | Mod: CPTII,S$GLB,, | Performed by: DERMATOLOGY

## 2023-04-21 PROCEDURE — 1160F RVW MEDS BY RX/DR IN RCRD: CPT | Mod: CPTII,S$GLB,, | Performed by: DERMATOLOGY

## 2023-04-21 PROCEDURE — 1159F PR MEDICATION LIST DOCUMENTED IN MEDICAL RECORD: ICD-10-PCS | Mod: CPTII,S$GLB,, | Performed by: DERMATOLOGY

## 2023-04-21 PROCEDURE — 99214 PR OFFICE/OUTPT VISIT, EST, LEVL IV, 30-39 MIN: ICD-10-PCS | Mod: S$GLB,,, | Performed by: DERMATOLOGY

## 2023-04-21 RX ORDER — SECUKINUMAB 150 MG/ML
INJECTION SUBCUTANEOUS
Qty: 2 EACH | Refills: 5 | Status: SHIPPED | OUTPATIENT
Start: 2023-04-21 | End: 2024-03-21

## 2023-04-21 NOTE — PROGRESS NOTES
Patient Information  Name: Oleksandr Dawkins  : 1970  MRN: [id number]     Referring Physician:  No ref. provider found   Primary Care Physician:  Primary Doctor No   Date of Visit: 2023      Subjective:     History of Present lllness:    Oleksandr Dawkins is a 52 y.o. male who presents with a chief complaint of psoriasis.    Diagnosis: Psoriasis vulgaris, Psoriatic arthritis, Long-term use of high-risk medication  Location: right thigh  Signs/Symptoms: only one spot on skin left, joints are better  Symptom course: improving  Current treatment: Cosentyx- last injected 23    Patient was last seen: 10/27/2022.  Prior notes by myself reviewed.   Clinical documentation obtained by nursing staff reviewed.    Review of Systems   Constitutional:  Negative for fever, weight loss, weight gain, fatigue and malaise.   HENT:  Negative for sore throat and mouth sores (no tongue whiteness).    Respiratory:  Negative for shortness of breath.    Gastrointestinal:  Negative for nausea, vomiting, abdominal pain and diarrhea.   Musculoskeletal:  Positive for arthralgias (improved, 10-15 min of stiffness in the morning). Negative for myalgias, joint swelling and muscle weakness.   Skin:  Negative for itching, rash, sun sensitivity and daily sunscreen use.        Injection site reaction - no    Neurological:  Negative for seizures.   Psychiatric/Behavioral:  Negative for depressed mood.      Objective:   Physical Exam   Constitutional: He appears well-developed and well-nourished. No distress.   Neurological: He is alert and oriented to person, place, and time. He is not disoriented.   Psychiatric: He has a normal mood and affect.   Skin:   Areas Examined (abnormalities noted in diagram):   Head / Face Inspection Performed  RUE Inspected  LUE Inspection Performed  RLE Inspected  LLE Inspection Performed          Diagram Legend     Erythematous scaling macule/papule c/w actinic keratosis       Vascular papule c/w angioma       Pigmented verrucoid papule/plaque c/w seborrheic keratosis      Yellow umbilicated papule c/w sebaceous hyperplasia      Irregularly shaped tan macule c/w lentigo     1-2 mm smooth white papules consistent with Milia      Movable subcutaneous cyst with punctum c/w epidermal inclusion cyst      Subcutaneous movable cyst c/w pilar cyst      Firm pink to brown papule c/w dermatofibroma      Pedunculated fleshy papule(s) c/w skin tag(s)      Evenly pigmented macule c/w junctional nevus     Mildly variegated pigmented, slightly irregular-bordered macule c/w mildly atypical nevus      Flesh colored to evenly pigmented papule c/w intradermal nevus       Pink pearly papule/plaque c/w basal cell carcinoma      Erythematous hyperkeratotic cursted plaque c/w SCC      Surgical scar with no sign of skin cancer recurrence      Open and closed comedones      Inflammatory papules and pustules      Verrucoid papule consistent consistent with wart     Erythematous eczematous patches and plaques     Dystrophic onycholytic nail with subungual debris c/w onychomycosis     Umbilicated papule    Erythematous-base heme-crusted tan verrucoid plaque consistent with inflamed seborrheic keratosis     Erythematous Silvery Scaling Plaque c/w Psoriasis     See annotation    No images are attached to the encounter or orders placed in the encounter.      [] Data reviewed  [] Prior external notes reviewed  [] Independent review of test  [] Management discussed with another provider  [] Independent historian    Assessment / Plan:        Psoriasis vulgaris  - stable and chronic  Psoriatic arthritis  - stable and chronic  Long-term use of high-risk medication  Much improved on Cosentyx. Well tolerated. Will continue.  Check CBC, CMP. Next quant gold due 11/2022.  -     CBC Auto Differential; Future; Expected date: 04/21/2023  -     Comprehensive Metabolic Panel; Future; Expected date: 04/21/2023  -     COSENTYX PEN, 2 PENS, 150 mg/mL PnIj; Inject 300mg  (2 pens) SQ q 4 weeks  Dispense: 2 each; Refill: 5      Follow up in about 6 months (around 10/21/2023).      Marce Falcon MD, BENTLEY  [facility]

## 2023-04-25 ENCOUNTER — LAB VISIT (OUTPATIENT)
Dept: LAB | Facility: HOSPITAL | Age: 53
End: 2023-04-25
Attending: DERMATOLOGY
Payer: COMMERCIAL

## 2023-04-25 DIAGNOSIS — L40.0 PSORIASIS VULGARIS: ICD-10-CM

## 2023-04-25 DIAGNOSIS — Z79.899 LONG-TERM USE OF HIGH-RISK MEDICATION: ICD-10-CM

## 2023-04-25 DIAGNOSIS — L40.50 PSORIATIC ARTHRITIS: ICD-10-CM

## 2023-04-25 LAB
ALBUMIN SERPL BCP-MCNC: 3.9 G/DL (ref 3.5–5.2)
ALP SERPL-CCNC: 65 U/L (ref 55–135)
ALT SERPL W/O P-5'-P-CCNC: 26 U/L (ref 10–44)
ANION GAP SERPL CALC-SCNC: 9 MMOL/L (ref 8–16)
AST SERPL-CCNC: 22 U/L (ref 10–40)
BASOPHILS # BLD AUTO: 0.03 K/UL (ref 0–0.2)
BASOPHILS NFR BLD: 0.5 % (ref 0–1.9)
BILIRUB SERPL-MCNC: 0.8 MG/DL (ref 0.1–1)
BUN SERPL-MCNC: 13 MG/DL (ref 6–20)
CALCIUM SERPL-MCNC: 9.3 MG/DL (ref 8.7–10.5)
CHLORIDE SERPL-SCNC: 104 MMOL/L (ref 95–110)
CO2 SERPL-SCNC: 28 MMOL/L (ref 23–29)
CREAT SERPL-MCNC: 0.9 MG/DL (ref 0.5–1.4)
DIFFERENTIAL METHOD: ABNORMAL
EOSINOPHIL # BLD AUTO: 0.1 K/UL (ref 0–0.5)
EOSINOPHIL NFR BLD: 2.5 % (ref 0–8)
ERYTHROCYTE [DISTWIDTH] IN BLOOD BY AUTOMATED COUNT: 12.7 % (ref 11.5–14.5)
EST. GFR  (NO RACE VARIABLE): >60 ML/MIN/1.73 M^2
GLUCOSE SERPL-MCNC: 89 MG/DL (ref 70–110)
HCT VFR BLD AUTO: 48.8 % (ref 40–54)
HGB BLD-MCNC: 15.4 G/DL (ref 14–18)
IMM GRANULOCYTES # BLD AUTO: 0.04 K/UL (ref 0–0.04)
IMM GRANULOCYTES NFR BLD AUTO: 0.7 % (ref 0–0.5)
LYMPHOCYTES # BLD AUTO: 2.3 K/UL (ref 1–4.8)
LYMPHOCYTES NFR BLD: 39.9 % (ref 18–48)
MCH RBC QN AUTO: 28.4 PG (ref 27–31)
MCHC RBC AUTO-ENTMCNC: 31.6 G/DL (ref 32–36)
MCV RBC AUTO: 90 FL (ref 82–98)
MONOCYTES # BLD AUTO: 0.4 K/UL (ref 0.3–1)
MONOCYTES NFR BLD: 7.6 % (ref 4–15)
NEUTROPHILS # BLD AUTO: 2.8 K/UL (ref 1.8–7.7)
NEUTROPHILS NFR BLD: 48.8 % (ref 38–73)
NRBC BLD-RTO: 0 /100 WBC
PLATELET # BLD AUTO: 174 K/UL (ref 150–450)
PMV BLD AUTO: 10.9 FL (ref 9.2–12.9)
POTASSIUM SERPL-SCNC: 4.2 MMOL/L (ref 3.5–5.1)
PROT SERPL-MCNC: 6.7 G/DL (ref 6–8.4)
RBC # BLD AUTO: 5.43 M/UL (ref 4.6–6.2)
SODIUM SERPL-SCNC: 141 MMOL/L (ref 136–145)
WBC # BLD AUTO: 5.66 K/UL (ref 3.9–12.7)

## 2023-04-25 PROCEDURE — 85025 COMPLETE CBC W/AUTO DIFF WBC: CPT | Performed by: DERMATOLOGY

## 2023-04-25 PROCEDURE — 80053 COMPREHEN METABOLIC PANEL: CPT | Performed by: DERMATOLOGY

## 2023-04-25 PROCEDURE — 36415 COLL VENOUS BLD VENIPUNCTURE: CPT | Mod: PO | Performed by: DERMATOLOGY

## 2023-07-04 ENCOUNTER — OFFICE VISIT (OUTPATIENT)
Dept: URGENT CARE | Facility: CLINIC | Age: 53
End: 2023-07-04
Payer: COMMERCIAL

## 2023-07-04 VITALS
OXYGEN SATURATION: 96 % | BODY MASS INDEX: 39.53 KG/M2 | SYSTOLIC BLOOD PRESSURE: 105 MMHG | TEMPERATURE: 98 F | HEART RATE: 78 BPM | RESPIRATION RATE: 16 BRPM | WEIGHT: 246 LBS | HEIGHT: 66 IN | DIASTOLIC BLOOD PRESSURE: 73 MMHG
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
## 2023-07-04 DIAGNOSIS — R21 RASH AND NONSPECIFIC SKIN ERUPTION: ICD-10-CM

## 2023-07-04 DIAGNOSIS — S30.860A TICK BITE OF LOWER BACK, INITIAL ENCOUNTER: Primary | ICD-10-CM

## 2023-07-04 PROCEDURE — 99213 OFFICE O/P EST LOW 20 MIN: CPT | Mod: S$GLB,,, | Performed by: FAMILY MEDICINE

## 2023-07-04 PROCEDURE — 99213 PR OFFICE/OUTPT VISIT, EST, LEVL III, 20-29 MIN: ICD-10-PCS | Mod: S$GLB,,, | Performed by: FAMILY MEDICINE

## 2023-07-04 RX ORDER — MUPIROCIN 20 MG/G
OINTMENT TOPICAL
Qty: 22 G | Refills: 1 | Status: SHIPPED | OUTPATIENT
Start: 2023-07-04 | End: 2024-01-17

## 2023-07-04 RX ORDER — DOXYCYCLINE HYCLATE 100 MG
100 TABLET ORAL 2 TIMES DAILY
Qty: 20 TABLET | Refills: 0 | Status: SHIPPED | OUTPATIENT
Start: 2023-07-04 | End: 2023-07-14

## 2023-07-04 NOTE — PROGRESS NOTES
"Subjective:      Patient ID: Oleksandr Dawkins is a 52 y.o. male.    Vitals:  height is 5' 6" (1.676 m) and weight is 111.6 kg (246 lb). His oral temperature is 98.2 °F (36.8 °C). His blood pressure is 105/73 and his pulse is 78. His respiration is 16 and oxygen saturation is 96%.     Chief Complaint: Insect Bite (Tick)    Patient reports getting tick bite 3 weeks ago.  States his wife completely removed tick from back. Patient reports area is red and irritated.  Denies any pain.  Denies fever, chills, weakness.    ROS   Objective:     Physical Exam   Constitutional: He is oriented to person, place, and time. He appears well-developed. He is cooperative.   HENT:   Head: Normocephalic and atraumatic.   Ears:   Right Ear: Hearing, tympanic membrane, external ear and ear canal normal. impacted cerumen  Left Ear: Hearing, tympanic membrane, external ear and ear canal normal. impacted cerumen  Nose: Nose normal. No mucosal edema, rhinorrhea, nasal deformity or congestion. No epistaxis. Right sinus exhibits no maxillary sinus tenderness and no frontal sinus tenderness. Left sinus exhibits no maxillary sinus tenderness and no frontal sinus tenderness.   Mouth/Throat: Uvula is midline, oropharynx is clear and moist and mucous membranes are normal. No trismus in the jaw. Normal dentition. No uvula swelling. No oropharyngeal exudate or posterior oropharyngeal erythema.   Eyes: Conjunctivae and lids are normal.   Neck: Trachea normal and phonation normal. Neck supple.   Cardiovascular: Normal rate, regular rhythm, normal heart sounds and normal pulses.   No murmur heard.  Pulmonary/Chest: Effort normal and breath sounds normal. No stridor. No respiratory distress. He has no wheezes. He has no rhonchi. He has no rales.   Abdominal: Normal appearance and bowel sounds are normal. He exhibits no distension. Soft. There is no abdominal tenderness.   Musculoskeletal: Normal range of motion.         General: Normal range of motion.      " Cervical back: He exhibits no tenderness.   Lymphadenopathy:     He has no cervical adenopathy.   Neurological: He is alert and oriented to person, place, and time. He exhibits normal muscle tone.   Skin: Skin is warm, dry and intact.         Comments:   Left lower back:  Positive about 3 cm area of redness.  No induration or fluctuation.    No significant tenderness.  No discharge.   Psychiatric: His speech is normal and behavior is normal. Judgment and thought content normal.   Nursing note and vitals reviewed.    Assessment:     1. Tick bite of lower back, initial encounter    2. Rash and nonspecific skin eruption        Plan:       Tick bite of lower back, initial encounter    Rash and nonspecific skin eruption    Other orders  -     doxycycline (VIBRA-TABS) 100 MG tablet; Take 1 tablet (100 mg total) by mouth 2 (two) times daily. for 10 days  Dispense: 20 tablet; Refill: 0  -     mupirocin (BACTROBAN) 2 % ointment; Apply to affected area 3 times daily  Dispense: 22 g; Refill: 1

## 2023-09-13 DIAGNOSIS — I25.5 ISCHEMIC CARDIOMYOPATHY: ICD-10-CM

## 2023-09-13 RX ORDER — SACUBITRIL AND VALSARTAN 24; 26 MG/1; MG/1
1 TABLET, FILM COATED ORAL 2 TIMES DAILY
Qty: 60 TABLET | Refills: 3 | Status: SHIPPED | OUTPATIENT
Start: 2023-09-13 | End: 2024-03-08 | Stop reason: SDUPTHER

## 2023-09-18 ENCOUNTER — TELEPHONE (OUTPATIENT)
Dept: CARDIOLOGY | Facility: CLINIC | Age: 53
End: 2023-09-18
Payer: COMMERCIAL

## 2023-09-23 ENCOUNTER — IMMUNIZATION (OUTPATIENT)
Dept: INTERNAL MEDICINE | Facility: CLINIC | Age: 53
End: 2023-09-23
Payer: COMMERCIAL

## 2023-09-23 PROCEDURE — 90686 IIV4 VACC NO PRSV 0.5 ML IM: CPT | Mod: S$GLB,,, | Performed by: FAMILY MEDICINE

## 2023-09-23 PROCEDURE — 90471 IMMUNIZATION ADMIN: CPT | Mod: S$GLB,,, | Performed by: FAMILY MEDICINE

## 2023-09-23 PROCEDURE — 90686 FLU VACCINE (QUAD) GREATER THAN OR EQUAL TO 3YO PRESERVATIVE FREE IM: ICD-10-PCS | Mod: S$GLB,,, | Performed by: FAMILY MEDICINE

## 2023-09-23 PROCEDURE — 90471 FLU VACCINE (QUAD) GREATER THAN OR EQUAL TO 3YO PRESERVATIVE FREE IM: ICD-10-PCS | Mod: S$GLB,,, | Performed by: FAMILY MEDICINE

## 2023-09-25 ENCOUNTER — TELEPHONE (OUTPATIENT)
Dept: CARDIOLOGY | Facility: CLINIC | Age: 53
End: 2023-09-25
Payer: COMMERCIAL

## 2023-09-25 DIAGNOSIS — I25.10 CORONARY ARTERY DISEASE INVOLVING NATIVE CORONARY ARTERY OF NATIVE HEART WITHOUT ANGINA PECTORIS: Primary | ICD-10-CM

## 2023-09-25 DIAGNOSIS — E78.5 DYSLIPIDEMIA: ICD-10-CM

## 2023-10-08 DIAGNOSIS — E78.5 DYSLIPIDEMIA: ICD-10-CM

## 2023-10-08 DIAGNOSIS — E88.810 METABOLIC SYNDROME: ICD-10-CM

## 2023-10-08 DIAGNOSIS — E66.01 MORBID OBESITY: ICD-10-CM

## 2023-10-08 DIAGNOSIS — L40.9 PSORIASIS: ICD-10-CM

## 2023-10-08 DIAGNOSIS — Z90.3 S/P GASTRIC SLEEVE PROCEDURE: ICD-10-CM

## 2023-10-09 RX ORDER — ROSUVASTATIN CALCIUM 20 MG/1
TABLET, COATED ORAL
Qty: 90 TABLET | Refills: 2 | Status: SHIPPED | OUTPATIENT
Start: 2023-10-09

## 2023-12-04 ENCOUNTER — LAB VISIT (OUTPATIENT)
Dept: LAB | Facility: HOSPITAL | Age: 53
End: 2023-12-04
Payer: COMMERCIAL

## 2023-12-04 DIAGNOSIS — E78.5 DYSLIPIDEMIA: ICD-10-CM

## 2023-12-04 DIAGNOSIS — I25.10 CORONARY ARTERY DISEASE INVOLVING NATIVE CORONARY ARTERY OF NATIVE HEART WITHOUT ANGINA PECTORIS: ICD-10-CM

## 2023-12-04 LAB
ALBUMIN SERPL BCP-MCNC: 4 G/DL (ref 3.5–5.2)
ALP SERPL-CCNC: 74 U/L (ref 55–135)
ALT SERPL W/O P-5'-P-CCNC: 33 U/L (ref 10–44)
ANION GAP SERPL CALC-SCNC: 10 MMOL/L (ref 8–16)
AST SERPL-CCNC: 28 U/L (ref 10–40)
BILIRUB SERPL-MCNC: 1.1 MG/DL (ref 0.1–1)
BUN SERPL-MCNC: 17 MG/DL (ref 6–20)
CALCIUM SERPL-MCNC: 10.1 MG/DL (ref 8.7–10.5)
CHLORIDE SERPL-SCNC: 102 MMOL/L (ref 95–110)
CHOLEST SERPL-MCNC: 142 MG/DL (ref 120–199)
CHOLEST/HDLC SERPL: 3.9 {RATIO} (ref 2–5)
CO2 SERPL-SCNC: 27 MMOL/L (ref 23–29)
CREAT SERPL-MCNC: 0.9 MG/DL (ref 0.5–1.4)
EST. GFR  (NO RACE VARIABLE): >60 ML/MIN/1.73 M^2
GLUCOSE SERPL-MCNC: 83 MG/DL (ref 70–110)
HDLC SERPL-MCNC: 36 MG/DL (ref 40–75)
HDLC SERPL: 25.4 % (ref 20–50)
LDLC SERPL CALC-MCNC: 77.2 MG/DL (ref 63–159)
NONHDLC SERPL-MCNC: 106 MG/DL
POTASSIUM SERPL-SCNC: 4.3 MMOL/L (ref 3.5–5.1)
PROT SERPL-MCNC: 7.3 G/DL (ref 6–8.4)
SODIUM SERPL-SCNC: 139 MMOL/L (ref 136–145)
TRIGL SERPL-MCNC: 144 MG/DL (ref 30–150)
TSH SERPL DL<=0.005 MIU/L-ACNC: 3.67 UIU/ML (ref 0.4–4)

## 2023-12-04 PROCEDURE — 80053 COMPREHEN METABOLIC PANEL: CPT | Performed by: INTERNAL MEDICINE

## 2023-12-04 PROCEDURE — 36415 COLL VENOUS BLD VENIPUNCTURE: CPT | Mod: PO | Performed by: INTERNAL MEDICINE

## 2023-12-04 PROCEDURE — 80061 LIPID PANEL: CPT | Performed by: INTERNAL MEDICINE

## 2023-12-04 PROCEDURE — 84443 ASSAY THYROID STIM HORMONE: CPT | Performed by: INTERNAL MEDICINE

## 2023-12-06 ENCOUNTER — TELEPHONE (OUTPATIENT)
Dept: CARDIOLOGY | Facility: CLINIC | Age: 53
End: 2023-12-06
Payer: COMMERCIAL

## 2023-12-06 DIAGNOSIS — E78.5 DYSLIPIDEMIA: Primary | ICD-10-CM

## 2023-12-06 DIAGNOSIS — I25.10 CORONARY ARTERY DISEASE INVOLVING NATIVE CORONARY ARTERY OF NATIVE HEART WITHOUT ANGINA PECTORIS: ICD-10-CM

## 2024-01-17 ENCOUNTER — OFFICE VISIT (OUTPATIENT)
Dept: CARDIOLOGY | Facility: CLINIC | Age: 54
End: 2024-01-17
Payer: COMMERCIAL

## 2024-01-17 VITALS
BODY MASS INDEX: 38.14 KG/M2 | DIASTOLIC BLOOD PRESSURE: 73 MMHG | HEART RATE: 72 BPM | SYSTOLIC BLOOD PRESSURE: 120 MMHG | WEIGHT: 243 LBS | HEIGHT: 67 IN

## 2024-01-17 DIAGNOSIS — I50.42 CHRONIC COMBINED SYSTOLIC AND DIASTOLIC HEART FAILURE: ICD-10-CM

## 2024-01-17 DIAGNOSIS — E88.810 METABOLIC SYNDROME: ICD-10-CM

## 2024-01-17 DIAGNOSIS — E66.01 MORBID OBESITY: ICD-10-CM

## 2024-01-17 DIAGNOSIS — L40.9 PSORIASIS: ICD-10-CM

## 2024-01-17 DIAGNOSIS — I25.2 OLD MYOCARDIAL INFARCTION: ICD-10-CM

## 2024-01-17 DIAGNOSIS — E78.5 DYSLIPIDEMIA: ICD-10-CM

## 2024-01-17 DIAGNOSIS — I25.10 CORONARY ARTERY DISEASE INVOLVING NATIVE CORONARY ARTERY OF NATIVE HEART WITHOUT ANGINA PECTORIS: Primary | ICD-10-CM

## 2024-01-17 DIAGNOSIS — I25.5 ISCHEMIC CARDIOMYOPATHY: ICD-10-CM

## 2024-01-17 DIAGNOSIS — Z90.3 S/P GASTRIC SLEEVE PROCEDURE: ICD-10-CM

## 2024-01-17 PROCEDURE — 3008F BODY MASS INDEX DOCD: CPT | Mod: CPTII,95,, | Performed by: INTERNAL MEDICINE

## 2024-01-17 PROCEDURE — 1160F RVW MEDS BY RX/DR IN RCRD: CPT | Mod: CPTII,95,, | Performed by: INTERNAL MEDICINE

## 2024-01-17 PROCEDURE — 3074F SYST BP LT 130 MM HG: CPT | Mod: CPTII,95,, | Performed by: INTERNAL MEDICINE

## 2024-01-17 PROCEDURE — 1159F MED LIST DOCD IN RCRD: CPT | Mod: CPTII,95,, | Performed by: INTERNAL MEDICINE

## 2024-01-17 PROCEDURE — 3078F DIAST BP <80 MM HG: CPT | Mod: CPTII,95,, | Performed by: INTERNAL MEDICINE

## 2024-01-17 PROCEDURE — 99214 OFFICE O/P EST MOD 30 MIN: CPT | Mod: 95,,, | Performed by: INTERNAL MEDICINE

## 2024-01-17 NOTE — PROGRESS NOTES
Subjective:   Patient ID:  Oleksandr Dawkins is a 53 y.o. male who presents for follow-up of Coronary Artery Disease      HPI: The patient location is: [facility]  The chief complaint leading to consultation is: CAD, ICMP    Visit type: audiovisual    Face to Face time with patient: 20min.    30 minutes of total time spent on the encounter, which includes face to face time and non-face to face time preparing to see the patient (eg, review of tests), Obtaining and/or reviewing separately obtained history, Documenting clinical information in the electronic or other health record, Independently interpreting results (not separately reported) and communicating results to the patient/family/caregiver, or Care coordination (not separately reported).      Each patient to whom he or she provides medical services by telemedicine is:  (1) informed of the relationship between the physician and patient and the respective role of any other health care provider with respect to management of the patient; and (2) notified that he or she may decline to receive medical services by telemedicine and may withdraw from such care at any time.    Notes: Patient is doing well. Denies dyspnea on exertion out of proportion to activity. He is commuting to [place] for business, but hopes to be done with it next month. He is going on cruise mid February.  Since last visit ( 2022) patient was able to lose 14 lbs.  However stopped taking Crestor, hoping that diet will suffice. After last blood work results patient has restarted Crestor.  BP is stable around 110 systolic.      We reviewed results of his blood work. Lipids have improved, but are not at goal yet.      Lab Results   Component Value Date     12/04/2023    K 4.3 12/04/2023     12/04/2023    CO2 27 12/04/2023    BUN 17 12/04/2023    CREATININE 0.9 12/04/2023    GLU 83 12/04/2023    HGBA1C 5.4 10/18/2022    AST 28 12/04/2023    ALT 33 12/04/2023    ALBUMIN 4.0 12/04/2023    PROT 7.3  12/04/2023    BILITOT 1.1 (H) 12/04/2023    WBC 5.66 04/25/2023    HGB 15.4 04/25/2023    HCT 48.8 04/25/2023    MCV 90 04/25/2023     04/25/2023    TSH 3.665 12/04/2023    CHOL 142 12/04/2023    HDL 36 (L) 12/04/2023    LDLCALC 77.2 12/04/2023    TRIG 144 12/04/2023       No results found in the last 24 hours.     ROS    Objective:   Physical Exam      Assessment and Plan:     Problem List Items Addressed This Visit          Cardiology Problems    Coronary artery disease involving native coronary artery without angina pectoris - Primary    Old myocardial infarction    Ischemic cardiomyopathy    Chronic combined systolic and diastolic heart failure       Other    Dyslipidemia    Morbid obesity    S/P gastric sleeve procedure    Metabolic syndrome    Psoriasis       Patient's Medications   New Prescriptions    No medications on file   Previous Medications    ASPIRIN (ECOTRIN) 81 MG EC TABLET    Take 81 mg by mouth once daily.    COSENTYX PEN, 2 PENS, 150 MG/ML PNIJ    Inject 300mg (2 pens) SQ q 4 weeks    ROSUVASTATIN (CRESTOR) 20 MG TABLET    TAKE 1 TABLET BY MOUTH EVERY DAY    SACUBITRIL-VALSARTAN (ENTRESTO) 24-26 MG PER TABLET    Take 1 tablet by mouth 2 (two) times daily.   Modified Medications    No medications on file   Discontinued Medications    MUPIROCIN (BACTROBAN) 2 % OINTMENT    Apply to affected area 3 times daily     We have discussed adding Aldactone and titration of his medications for ICMP.  Patient would rather postpone it till he is at  home for good. (beginning of March).  He is also interested if his LVEF has improved while on Entresto.  Will schedule CFD and start Aldactone in March.  Will consider Jardiance afterwards.  Repeat lipids and LFT's in 3 months. If LDL-C not <70 will increase Crestor to 40 mh daily.    No follow-ups on file.

## 2024-03-01 ENCOUNTER — HOSPITAL ENCOUNTER (OUTPATIENT)
Dept: CARDIOLOGY | Facility: HOSPITAL | Age: 54
Discharge: HOME | End: 2024-03-01
Attending: INTERNAL MEDICINE
Payer: COMMERCIAL

## 2024-03-01 VITALS
DIASTOLIC BLOOD PRESSURE: 80 MMHG | BODY MASS INDEX: 38.14 KG/M2 | WEIGHT: 243 LBS | HEART RATE: 68 BPM | HEIGHT: 67 IN | SYSTOLIC BLOOD PRESSURE: 100 MMHG

## 2024-03-01 DIAGNOSIS — I50.42 CHRONIC COMBINED SYSTOLIC AND DIASTOLIC HEART FAILURE: ICD-10-CM

## 2024-03-01 LAB
ASCENDING AORTA: 3.16 CM
AV INDEX (PROSTH): 0.65
AV MEAN GRADIENT: 5 MMHG
AV PEAK GRADIENT: 10 MMHG
AV VALVE AREA BY VELOCITY RATIO: 2.03 CM²
AV VALVE AREA: 2.08 CM²
AV VELOCITY RATIO: 0.63
BSA FOR ECHO PROCEDURE: 2.28 M2
CV ECHO LV RWT: 0.38 CM
DOP CALC AO PEAK VEL: 1.58 M/S
DOP CALC AO VTI: 34.79 CM
DOP CALC LVOT AREA: 3.2 CM2
DOP CALC LVOT DIAMETER: 2.02 CM
DOP CALC LVOT PEAK VEL: 1 M/S
DOP CALC LVOT STROKE VOLUME: 72.49 CM3
DOP CALC RVOT PEAK VEL: 0.53 M/S
DOP CALC RVOT VTI: 13.36 CM
DOP CALCLVOT PEAK VEL VTI: 22.63 CM
E WAVE DECELERATION TIME: 174.52 MSEC
E/A RATIO: 1.35
E/E' RATIO: 8.11 M/S
ECHO LV POSTERIOR WALL: 0.8 CM (ref 0.6–1.1)
EJECTION FRACTION: 45 %
FRACTIONAL SHORTENING: 20 % (ref 28–44)
INTERVENTRICULAR SEPTUM: 0.81 CM (ref 0.6–1.1)
LA MAJOR: 4.98 CM
LA MINOR: 4.98 CM
LA WIDTH: 3.8 CM
LEFT ATRIUM SIZE: 3.7 CM
LEFT ATRIUM VOLUME INDEX MOD: 25.8 ML/M2
LEFT ATRIUM VOLUME INDEX: 27.1 ML/M2
LEFT ATRIUM VOLUME MOD: 56.75 CM3
LEFT ATRIUM VOLUME: 59.52 CM3
LEFT INTERNAL DIMENSION IN SYSTOLE: 3.37 CM (ref 2.1–4)
LEFT VENTRICLE DIASTOLIC VOLUME INDEX: 36.06 ML/M2
LEFT VENTRICLE DIASTOLIC VOLUME: 79.34 ML
LEFT VENTRICLE MASS INDEX: 47 G/M2
LEFT VENTRICLE SYSTOLIC VOLUME INDEX: 21.1 ML/M2
LEFT VENTRICLE SYSTOLIC VOLUME: 46.49 ML
LEFT VENTRICULAR INTERNAL DIMENSION IN DIASTOLE: 4.22 CM (ref 3.5–6)
LEFT VENTRICULAR MASS: 102.94 G
LV LATERAL E/E' RATIO: 7 M/S
LV SEPTAL E/E' RATIO: 9.63 M/S
MV A" WAVE DURATION": 14.27 MSEC
MV PEAK A VEL: 0.57 M/S
MV PEAK E VEL: 0.77 M/S
MV STENOSIS PRESSURE HALF TIME: 50.61 MS
MV VALVE AREA P 1/2 METHOD: 4.35 CM2
PISA TR MAX VEL: 2.09 M/S
PULM VEIN S/D RATIO: 1.23
PV MEAN GRADIENT: 1 MMHG
PV PEAK D VEL: 0.35 M/S
PV PEAK GRADIENT: 2 MMHG
PV PEAK S VEL: 0.43 M/S
PV PEAK VELOCITY: 0.65 M/S
RA MAJOR: 4.45 CM
RA PRESSURE ESTIMATED: 3 MMHG
RA WIDTH: 2.47 CM
RIGHT VENTRICULAR END-DIASTOLIC DIMENSION: 2.47 CM
RV TB RVSP: 5 MMHG
SINUS: 3.37 CM
STJ: 2.7 CM
TDI LATERAL: 0.11 M/S
TDI SEPTAL: 0.08 M/S
TDI: 0.1 M/S
TR MAX PG: 17 MMHG
TRICUSPID ANNULAR PLANE SYSTOLIC EXCURSION: 2.61 CM
TV REST PULMONARY ARTERY PRESSURE: 20 MMHG
Z-SCORE OF LEFT VENTRICULAR DIMENSION IN END DIASTOLE: -5.77
Z-SCORE OF LEFT VENTRICULAR DIMENSION IN END SYSTOLE: -2.4

## 2024-03-01 PROCEDURE — 93306 TTE W/DOPPLER COMPLETE: CPT | Mod: 26,,, | Performed by: INTERNAL MEDICINE

## 2024-03-01 PROCEDURE — 93306 TTE W/DOPPLER COMPLETE: CPT | Mod: PO

## 2024-03-05 NOTE — PROGRESS NOTES
Please inform the patient that his LV function remains midly depressed. I would recommend Aldactone 25mg daily and repeat BMP in 1 week.

## 2024-03-08 DIAGNOSIS — I25.5 ISCHEMIC CARDIOMYOPATHY: ICD-10-CM

## 2024-03-11 RX ORDER — SACUBITRIL AND VALSARTAN 24; 26 MG/1; MG/1
1 TABLET, FILM COATED ORAL 2 TIMES DAILY
Qty: 60 TABLET | Refills: 3 | Status: SHIPPED | OUTPATIENT
Start: 2024-03-11

## 2024-03-21 ENCOUNTER — OFFICE VISIT (OUTPATIENT)
Dept: DERMATOLOGY | Facility: CLINIC | Age: 54
End: 2024-03-21
Payer: COMMERCIAL

## 2024-03-21 DIAGNOSIS — L40.50 PSORIATIC ARTHRITIS: ICD-10-CM

## 2024-03-21 DIAGNOSIS — Z79.899 LONG-TERM USE OF HIGH-RISK MEDICATION: ICD-10-CM

## 2024-03-21 DIAGNOSIS — L40.0 PSORIASIS VULGARIS: Primary | ICD-10-CM

## 2024-03-21 PROCEDURE — 1160F RVW MEDS BY RX/DR IN RCRD: CPT | Mod: CPTII,S$GLB,, | Performed by: DERMATOLOGY

## 2024-03-21 PROCEDURE — 99214 OFFICE O/P EST MOD 30 MIN: CPT | Mod: S$GLB,,, | Performed by: DERMATOLOGY

## 2024-03-21 PROCEDURE — 1159F MED LIST DOCD IN RCRD: CPT | Mod: CPTII,S$GLB,, | Performed by: DERMATOLOGY

## 2024-03-21 PROCEDURE — 4010F ACE/ARB THERAPY RXD/TAKEN: CPT | Mod: CPTII,S$GLB,, | Performed by: DERMATOLOGY

## 2024-03-21 RX ORDER — TAPINAROF 10 MG/1000MG
CREAM TOPICAL
Qty: 60 G | Refills: 2 | Status: SHIPPED | OUTPATIENT
Start: 2024-03-21

## 2024-03-21 NOTE — PROGRESS NOTES
Patient Information  Name: Oleksandr aDwkins  : 1970  MRN: [id number]     Referring Physician:  No ref. provider found   Primary Care Physician:  Stephanie, Primary Doctor   Date of Visit: 2024      Subjective:     History of Present lllness:    Oleksandr Dawkins is a 53 y.o. male who presents with a chief complaint of psoriasis.  Location: arms and legs  Signs/Symptoms: psoriasis on skin is starting to come back since being off of Cosentyx (stopped due to $400 co-pay; last injection was in September); he does not feel that his joints were any better while on Cosentyx and are no worse since being off of Cosentyx   Symptom course: worsening  Current treatment: no current tx    Patient was last seen: 2023.  Prior notes by myself reviewed.   Clinical documentation obtained by nursing staff reviewed.    Review of Systems   Musculoskeletal:  Positive for arthralgias. Negative for joint swelling.   Skin:  Positive for itching and rash.       Objective:   Physical Exam   Constitutional: He appears well-developed and well-nourished. No distress.   Neurological: He is alert and oriented to person, place, and time. He is not disoriented.   Psychiatric: He has a normal mood and affect.   Skin:   Areas Examined (abnormalities noted in diagram):   Back Inspection Performed  RUE Inspected  LUE Inspection Performed  RLE Inspected  LLE Inspection Performed            Diagram Legend     Erythematous scaling macule/papule c/w actinic keratosis       Vascular papule c/w angioma      Pigmented verrucoid papule/plaque c/w seborrheic keratosis      Yellow umbilicated papule c/w sebaceous hyperplasia      Irregularly shaped tan macule c/w lentigo     1-2 mm smooth white papules consistent with Milia      Movable subcutaneous cyst with punctum c/w epidermal inclusion cyst      Subcutaneous movable cyst c/w pilar cyst      Firm pink to brown papule c/w dermatofibroma      Pedunculated fleshy papule(s) c/w skin tag(s)      Evenly  pigmented macule c/w junctional nevus     Mildly variegated pigmented, slightly irregular-bordered macule c/w mildly atypical nevus      Flesh colored to evenly pigmented papule c/w intradermal nevus       Pink pearly papule/plaque c/w basal cell carcinoma      Erythematous hyperkeratotic cursted plaque c/w SCC      Surgical scar with no sign of skin cancer recurrence      Open and closed comedones      Inflammatory papules and pustules      Verrucoid papule consistent consistent with wart     Erythematous eczematous patches and plaques     Dystrophic onycholytic nail with subungual debris c/w onychomycosis     Umbilicated papule    Erythematous-base heme-crusted tan verrucoid plaque consistent with inflamed seborrheic keratosis     Erythematous Silvery Scaling Plaque c/w Psoriasis     See annotation    No images are attached to the encounter or orders placed in the encounter.      [] Data reviewed  [] Prior external notes reviewed  [] Independent review of test  [] Management discussed with another provider  [] Independent historian    Assessment / Plan:        Psoriasis vulgaris  - chronic problem, not at treatment goal  Pt has been off of a biologic for 6 months with minimal recurrence (BSA <1%).   Will hold off on a biologic for now and start Vtama.  -     tapinarof (VTAMA) 1 % Crea; Apply a tiny amount to affected areas of body once daily prn psoriasis.  Dispense: 60 g; Refill: 2    Psoriatic arthritis   - stable and chronic  Pt has been off of a biologic for 6 months with no flaring of joint disease.  Will hold off on a biologic for now and continue to monitor.      Follow up in about 3 months (around 6/21/2024).      Marce Falcon MD, FAAD  [facility]

## 2024-04-08 ENCOUNTER — PATIENT MESSAGE (OUTPATIENT)
Dept: DERMATOLOGY | Facility: CLINIC | Age: 54
End: 2024-04-08
Payer: COMMERCIAL

## 2024-04-10 ENCOUNTER — OFFICE VISIT (OUTPATIENT)
Dept: DERMATOLOGY | Facility: CLINIC | Age: 54
End: 2024-04-10
Payer: COMMERCIAL

## 2024-04-10 DIAGNOSIS — Z79.899 LONG-TERM USE OF HIGH-RISK MEDICATION: ICD-10-CM

## 2024-04-10 DIAGNOSIS — L40.50 PSORIATIC ARTHRITIS: ICD-10-CM

## 2024-04-10 DIAGNOSIS — L40.0 PSORIASIS VULGARIS: Primary | ICD-10-CM

## 2024-04-10 PROCEDURE — 1160F RVW MEDS BY RX/DR IN RCRD: CPT | Mod: CPTII,95,, | Performed by: DERMATOLOGY

## 2024-04-10 PROCEDURE — 99214 OFFICE O/P EST MOD 30 MIN: CPT | Mod: 95,,, | Performed by: DERMATOLOGY

## 2024-04-10 PROCEDURE — 1159F MED LIST DOCD IN RCRD: CPT | Mod: CPTII,95,, | Performed by: DERMATOLOGY

## 2024-04-10 PROCEDURE — 4010F ACE/ARB THERAPY RXD/TAKEN: CPT | Mod: CPTII,95,, | Performed by: DERMATOLOGY

## 2024-04-10 RX ORDER — IXEKIZUMAB 80 MG/ML
INJECTION, SOLUTION SUBCUTANEOUS
Qty: 2 ML | Refills: 1 | Status: ACTIVE | OUTPATIENT
Start: 2024-04-10

## 2024-04-10 RX ORDER — IXEKIZUMAB 80 MG/ML
INJECTION, SOLUTION SUBCUTANEOUS
Qty: 1 ML | Refills: 2 | Status: ACTIVE | OUTPATIENT
Start: 2024-04-10

## 2024-04-10 RX ORDER — IXEKIZUMAB 80 MG/ML
INJECTION, SOLUTION SUBCUTANEOUS
Qty: 3 ML | Refills: 0 | Status: ACTIVE | OUTPATIENT
Start: 2024-04-10

## 2024-04-10 NOTE — PROGRESS NOTES
The patient location is: home  The chief complaint leading to consultation is: psoriasis  Visit type: virtual visit with synchronous audio and video  Total time spent with patient: 10 minutes  Each patient to whom I provide medical services by telemedicine is:  (1) informed of the relationship between the physician and patient and the respective role of any other health care provider with respect to management of the patient; and (2) notified that he or she may decline to receive medical services by telemedicine and may withdraw from such care at any time.      Patient Information  Name: Oleksandr Dawkins  : 1970  MRN: [id number]     Referring Physician:  Stephanie ref. provider found   Primary Care Physician:  Stephanie, Primary Doctor   Date of Visit: 04/10/2024      Subjective:     History of Present lllness:    Oleksandr Dawkins is a 53 y.o. male who presents with a chief complaint of psoriasis/painful joints.  Location: hands  Signs/Symptoms: joints in hands are becoming more painful, he has trouble making a fist  Symptom course: worsening joints, skin is stable  Current treatment: [facility]    Patient was last seen: 3/21/2024.  Prior notes by myself reviewed.   Clinical documentation obtained by nursing staff reviewed.    Review of Systems   Constitutional:  Negative for weight loss and weight gain.   HENT:  Negative for mouth sores (no tongue whiteness).    Respiratory:  Negative for shortness of breath.    Gastrointestinal:  Negative for nausea, vomiting, abdominal pain and diarrhea.   Musculoskeletal:  Positive for joint swelling and arthralgias (and joint stiffness).   Skin:  Positive for itching and rash (but no rashes/itching in folds).   Psychiatric/Behavioral:  Negative for depressed mood.        Objective:   Physical Exam   Constitutional: He appears well-developed and well-nourished. No distress.   Neurological: He is alert and oriented to person, place, and time. He is not disoriented.   Psychiatric: He has a normal  mood and affect.   Skin:   Areas Examined (abnormalities noted in diagram):   Head / Face Inspection Performed       Diagram Legend     Erythematous scaling macule/papule c/w actinic keratosis       Vascular papule c/w angioma      Pigmented verrucoid papule/plaque c/w seborrheic keratosis      Yellow umbilicated papule c/w sebaceous hyperplasia      Irregularly shaped tan macule c/w lentigo     1-2 mm smooth white papules consistent with Milia      Movable subcutaneous cyst with punctum c/w epidermal inclusion cyst      Subcutaneous movable cyst c/w pilar cyst      Firm pink to brown papule c/w dermatofibroma      Pedunculated fleshy papule(s) c/w skin tag(s)      Evenly pigmented macule c/w junctional nevus     Mildly variegated pigmented, slightly irregular-bordered macule c/w mildly atypical nevus      Flesh colored to evenly pigmented papule c/w intradermal nevus       Pink pearly papule/plaque c/w basal cell carcinoma      Erythematous hyperkeratotic cursted plaque c/w SCC      Surgical scar with no sign of skin cancer recurrence      Open and closed comedones      Inflammatory papules and pustules      Verrucoid papule consistent consistent with wart     Erythematous eczematous patches and plaques     Dystrophic onycholytic nail with subungual debris c/w onychomycosis     Umbilicated papule    Erythematous-base heme-crusted tan verrucoid plaque consistent with inflamed seborrheic keratosis     Erythematous Silvery Scaling Plaque c/w Psoriasis     See annotation          [] Data reviewed  [] Prior external notes reviewed  [] Independent review of test  [] Management discussed with another provider  [] Independent historian    Assessment / Plan:        Psoriasis vulgaris  - stable and chronic  Psoriatic arthritis - chronic problem with exacerbation/progression  Long-term use of high-risk medication  Given the progression of his psoriatic arthritis, will proceed with anti-IL17 therapy.   Discussed benefits and  risks of ixekizumab (Taltz) today, including but not limited to injection site reactions, increased risk of infections especially candidiasis/tinea, reactivation of tuberculosis, and new onset inflammatory bowel disease. Patient is to call with any side effects. Patient should discontinue Taltz and notify our office if an infection develops. Live vaccines should be avoided while on this medication.    Will obtain yearly Quantiferon gold.   Start ixekizumab 160 mg SC x 1, then 80 mg SC q2wk x 12wks, then 80 mg SC q4wk.    -     TALTZ AUTOINJECTOR, 3 PACK, 80 mg/mL AtIn; Inject 160mg SQ at week 0 then inject 80mg SQ at week 2.  Dispense: 3 mL; Refill: 0  -     TALTZ AUTOINJECTOR, 2 PACK, 80 mg/mL AtIn; Inject 80mg SQ every other week (weeks 4, 6, 8, 10).  Dispense: 2 mL; Refill: 1  -     TALTZ AUTOINJECTOR 80 mg/mL AtIn; Inject 80mg SQ every 4 weeks (start week 12)  Dispense: 1 mL; Refill: 2  -     Quantiferon Gold TB; Future; Expected date: 04/10/2024      Follow up in about 3 months (around 7/10/2024) for follow up.      Marce Falcon MD, PAVELD  [facility]

## 2024-04-11 ENCOUNTER — PATIENT MESSAGE (OUTPATIENT)
Dept: DERMATOLOGY | Facility: CLINIC | Age: 54
End: 2024-04-11
Payer: COMMERCIAL

## 2024-04-11 ENCOUNTER — LAB VISIT (OUTPATIENT)
Dept: LAB | Facility: HOSPITAL | Age: 54
End: 2024-04-11
Attending: DERMATOLOGY
Payer: COMMERCIAL

## 2024-04-11 DIAGNOSIS — Z79.899 LONG-TERM USE OF HIGH-RISK MEDICATION: ICD-10-CM

## 2024-04-11 DIAGNOSIS — L40.50 PSORIATIC ARTHRITIS: ICD-10-CM

## 2024-04-11 DIAGNOSIS — L40.0 PSORIASIS VULGARIS: ICD-10-CM

## 2024-04-11 PROCEDURE — 86480 TB TEST CELL IMMUN MEASURE: CPT | Performed by: DERMATOLOGY

## 2024-04-13 LAB
GAMMA INTERFERON BACKGROUND BLD IA-ACNC: 0.07 IU/ML
M TB IFN-G CD4+ BCKGRND COR BLD-ACNC: -0.01 IU/ML
M TB IFN-G CD4+ BCKGRND COR BLD-ACNC: -0.02 IU/ML
MITOGEN IGNF BCKGRD COR BLD-ACNC: 9.93 IU/ML
TB GOLD PLUS: NEGATIVE

## 2024-07-06 DIAGNOSIS — E66.01 MORBID OBESITY: ICD-10-CM

## 2024-07-06 DIAGNOSIS — Z90.3 S/P GASTRIC SLEEVE PROCEDURE: ICD-10-CM

## 2024-07-06 DIAGNOSIS — E78.5 DYSLIPIDEMIA: ICD-10-CM

## 2024-07-06 DIAGNOSIS — L40.9 PSORIASIS: ICD-10-CM

## 2024-07-06 DIAGNOSIS — E88.810 METABOLIC SYNDROME: ICD-10-CM

## 2024-07-08 RX ORDER — ROSUVASTATIN CALCIUM 20 MG/1
TABLET, COATED ORAL
Qty: 90 TABLET | Refills: 2 | Status: SHIPPED | OUTPATIENT
Start: 2024-07-08

## 2024-07-17 ENCOUNTER — OFFICE VISIT (OUTPATIENT)
Dept: DERMATOLOGY | Facility: CLINIC | Age: 54
End: 2024-07-17
Payer: COMMERCIAL

## 2024-07-17 DIAGNOSIS — Z79.899 LONG-TERM USE OF HIGH-RISK MEDICATION: ICD-10-CM

## 2024-07-17 DIAGNOSIS — L40.0 PSORIASIS VULGARIS: Primary | ICD-10-CM

## 2024-07-17 DIAGNOSIS — L40.50 PSORIATIC ARTHRITIS: ICD-10-CM

## 2024-07-17 PROCEDURE — G2211 COMPLEX E/M VISIT ADD ON: HCPCS | Mod: 95,,, | Performed by: DERMATOLOGY

## 2024-07-17 PROCEDURE — 4010F ACE/ARB THERAPY RXD/TAKEN: CPT | Mod: CPTII,95,, | Performed by: DERMATOLOGY

## 2024-07-17 PROCEDURE — 99214 OFFICE O/P EST MOD 30 MIN: CPT | Mod: 95,,, | Performed by: DERMATOLOGY

## 2024-07-17 PROCEDURE — 1159F MED LIST DOCD IN RCRD: CPT | Mod: CPTII,95,, | Performed by: DERMATOLOGY

## 2024-07-17 PROCEDURE — 1160F RVW MEDS BY RX/DR IN RCRD: CPT | Mod: CPTII,95,, | Performed by: DERMATOLOGY

## 2024-07-17 RX ORDER — IXEKIZUMAB 80 MG/ML
INJECTION, SOLUTION SUBCUTANEOUS
Qty: 1 ML | Refills: 5 | Status: SHIPPED | OUTPATIENT
Start: 2024-07-17

## 2024-07-17 NOTE — PROGRESS NOTES
The patient location is: home  The chief complaint leading to consultation is: psoriasis  Visit type: virtual visit with synchronous audio and video  Total time spent with patient: 9 minutes  Each patient to whom I provide medical services by telemedicine is:  (1) informed of the relationship between the physician and patient and the respective role of any other health care provider with respect to management of the patient; and (2) notified that he or she may decline to receive medical services by telemedicine and may withdraw from such care at any time.      Patient Information  Name: Oleksandr Dawkins  : 1970  MRN: [id number]     Referring Physician:  No ref. provider found   Primary Care Physician:  Stephanie, Primary Doctor   Date of Visit: 2024      Subjective:     History of Present lllness:    Oleksnadr Dawkins is a 53 y.o. male who presents with a chief complaint of psoriasis.  Location: skin and joints  Signs/Symptoms: less scaling; joints are improving  Symptom course: improving  Current treatment: Taltz x 10 wks    Patient was last seen: 4/10/2024.  Prior notes by myself reviewed.   Clinical documentation obtained by nursing staff reviewed.    Review of Systems   Constitutional:  Negative for weight loss and weight gain.   HENT:  Negative for mouth sores (no tongue whiteness).    Respiratory:  Negative for shortness of breath.    Gastrointestinal:  Negative for nausea, vomiting, abdominal pain and diarrhea.   Musculoskeletal:  Negative for arthralgias (has not needed ibuprofen).   Skin:  Negative for itching and rash (but no rashes/itching in folds).        Injection site reaction - no   Psychiatric/Behavioral:  Negative for depressed mood.        Objective:   Physical Exam     Diagram Legend     Erythematous scaling macule/papule c/w actinic keratosis       Vascular papule c/w angioma      Pigmented verrucoid papule/plaque c/w seborrheic keratosis      Yellow umbilicated papule c/w sebaceous hyperplasia       Irregularly shaped tan macule c/w lentigo     1-2 mm smooth white papules consistent with Milia      Movable subcutaneous cyst with punctum c/w epidermal inclusion cyst      Subcutaneous movable cyst c/w pilar cyst      Firm pink to brown papule c/w dermatofibroma      Pedunculated fleshy papule(s) c/w skin tag(s)      Evenly pigmented macule c/w junctional nevus     Mildly variegated pigmented, slightly irregular-bordered macule c/w mildly atypical nevus      Flesh colored to evenly pigmented papule c/w intradermal nevus       Pink pearly papule/plaque c/w basal cell carcinoma      Erythematous hyperkeratotic cursted plaque c/w SCC      Surgical scar with no sign of skin cancer recurrence      Open and closed comedones      Inflammatory papules and pustules      Verrucoid papule consistent consistent with wart     Erythematous eczematous patches and plaques     Dystrophic onycholytic nail with subungual debris c/w onychomycosis     Umbilicated papule    Erythematous-base heme-crusted tan verrucoid plaque consistent with inflamed seborrheic keratosis     Erythematous Silvery Scaling Plaque c/w Psoriasis     See annotation          [] Data reviewed  [] Prior external notes reviewed  [] Independent review of test  [] Management discussed with another provider  [] Independent historian    Assessment / Plan:        Psoriasis vulgaris  - stable and chronic  Psoriatic arthritis  - stable and chronic  Long-term use of high-risk medication  -     CBC Auto Differential; Future; Expected date: 07/17/2024  -     Comprehensive Metabolic Panel; Future; Expected date: 07/17/2024  -     TALTZ AUTOINJECTOR 80 mg/mL AtIn; Inject 80mg SQ every 4 weeks (start week 12)  Dispense: 1 mL; Refill: 5      Follow up in about 6 months (around 1/17/2025).      Marce Falcon MD, FAAD  [facility]

## 2024-09-17 ENCOUNTER — OFFICE VISIT (OUTPATIENT)
Dept: URGENT CARE | Facility: CLINIC | Age: 54
End: 2024-09-17
Payer: COMMERCIAL

## 2024-09-17 VITALS
RESPIRATION RATE: 16 BRPM | WEIGHT: 243 LBS | HEIGHT: 67 IN | OXYGEN SATURATION: 95 % | DIASTOLIC BLOOD PRESSURE: 86 MMHG | BODY MASS INDEX: 38.14 KG/M2 | TEMPERATURE: 98 F | HEART RATE: 74 BPM | SYSTOLIC BLOOD PRESSURE: 125 MMHG

## 2024-09-17 DIAGNOSIS — L23.7 POISON IVY: Primary | ICD-10-CM

## 2024-09-17 PROCEDURE — 96372 THER/PROPH/DIAG INJ SC/IM: CPT | Mod: S$GLB,,, | Performed by: FAMILY MEDICINE

## 2024-09-17 PROCEDURE — 99213 OFFICE O/P EST LOW 20 MIN: CPT | Mod: 25,S$GLB,, | Performed by: FAMILY MEDICINE

## 2024-09-17 RX ORDER — DEXAMETHASONE SODIUM PHOSPHATE 10 MG/ML
5 INJECTION INTRAMUSCULAR; INTRAVENOUS
Status: COMPLETED | OUTPATIENT
Start: 2024-09-17 | End: 2024-09-17

## 2024-09-17 RX ORDER — HYDROXYZINE HYDROCHLORIDE 10 MG/1
10 TABLET, FILM COATED ORAL 3 TIMES DAILY PRN
Qty: 30 TABLET | Refills: 0 | Status: SHIPPED | OUTPATIENT
Start: 2024-09-17

## 2024-09-17 RX ORDER — TRIAMCINOLONE ACETONIDE 0.25 MG/G
CREAM TOPICAL 2 TIMES DAILY
Qty: 80 G | Refills: 0 | Status: SHIPPED | OUTPATIENT
Start: 2024-09-17

## 2024-09-17 RX ADMIN — DEXAMETHASONE SODIUM PHOSPHATE 5 MG: 10 INJECTION INTRAMUSCULAR; INTRAVENOUS at 09:09

## 2024-09-17 NOTE — PROGRESS NOTES
"Subjective:      Patient ID: Oleksandr Dawkins is a 53 y.o. male.    Vitals:  height is 5' 7" (1.702 m) and weight is 110.2 kg (243 lb). His oral temperature is 97.9 °F (36.6 °C). His blood pressure is 125/86 and his pulse is 74. His respiration is 16 and oxygen saturation is 95%.     Chief Complaint: Rash (Got into poison ivy or poison oak, 1% hydrocortisone cream not working, need stronger meds to help with itching - Entered by patient)    This is a 53 y.o. male who presents today with a chief complaint of rash ( possible poison ivy or poison oak) on the L arm that happened Thursday. Pt used OTC hydrocortisone cream      Rash  This is a new problem. The current episode started in the past 7 days. The problem is unchanged. The affected locations include the left arm, left ankle and left lower leg. The rash is characterized by itchiness. He was exposed to plant contact. Past treatments include anti-itch cream. The treatment provided no relief.       Skin:  Positive for rash and erythema.      Objective:     Physical Exam   Constitutional: He is oriented to person, place, and time. obesity  HENT:   Head: Normocephalic and atraumatic.   Ears:   Right Ear: External ear normal.   Left Ear: External ear normal.   Nose: No rhinorrhea or congestion.   Eyes: Conjunctivae are normal. Pupils are equal, round, and reactive to light. Extraocular movement intact   Neck: Neck supple.   Cardiovascular: Normal rate, regular rhythm and normal pulses.   Pulmonary/Chest: Effort normal and breath sounds normal. No respiratory distress.   Abdominal: Normal appearance and bowel sounds are normal. Soft. flat abdomen   Musculoskeletal: Normal range of motion.         General: Normal range of motion.   Neurological: no focal deficit. He is alert, oriented to person, place, and time and at baseline.   Skin: Skin is warm, dry and rash. Capillary refill takes less than 2 seconds. erythema jaundice  Psychiatric: His behavior is normal. Mood, " judgment and thought content normal.   Vitals reviewed.    Assessment:     Plan:   1. Poison ivy  - dexAMETHasone injection 5 mg  - hydrOXYzine HCL (ATARAX) 10 MG Tab; Take 1 tablet (10 mg total) by mouth 3 (three) times daily as needed.  Dispense: 30 tablet; Refill: 0  - triamcinolone acetonide 0.025% (KENALOG) 0.025 % cream; Apply topically 2 (two) times daily.  Dispense: 80 g; Refill: 0

## 2024-10-20 DIAGNOSIS — I25.5 ISCHEMIC CARDIOMYOPATHY: ICD-10-CM

## 2024-10-22 RX ORDER — SACUBITRIL AND VALSARTAN 24; 26 MG/1; MG/1
1 TABLET, FILM COATED ORAL 2 TIMES DAILY
Qty: 60 TABLET | Refills: 5 | Status: SHIPPED | OUTPATIENT
Start: 2024-10-22

## 2024-12-06 ENCOUNTER — PATIENT MESSAGE (OUTPATIENT)
Dept: DERMATOLOGY | Facility: CLINIC | Age: 54
End: 2024-12-06
Payer: COMMERCIAL

## 2024-12-06 ENCOUNTER — PATIENT MESSAGE (OUTPATIENT)
Dept: CARDIOLOGY | Facility: CLINIC | Age: 54
End: 2024-12-06
Payer: COMMERCIAL

## 2024-12-11 ENCOUNTER — OFFICE VISIT (OUTPATIENT)
Dept: DERMATOLOGY | Facility: CLINIC | Age: 54
End: 2024-12-11
Payer: COMMERCIAL

## 2024-12-11 DIAGNOSIS — L40.0 PSORIASIS VULGARIS: Primary | ICD-10-CM

## 2024-12-11 DIAGNOSIS — Z79.899 LONG-TERM USE OF HIGH-RISK MEDICATION: ICD-10-CM

## 2024-12-11 DIAGNOSIS — L40.50 PSORIATIC ARTHRITIS: ICD-10-CM

## 2024-12-11 PROCEDURE — 99214 OFFICE O/P EST MOD 30 MIN: CPT | Mod: S$GLB,,, | Performed by: DERMATOLOGY

## 2024-12-11 PROCEDURE — 1159F MED LIST DOCD IN RCRD: CPT | Mod: CPTII,S$GLB,, | Performed by: DERMATOLOGY

## 2024-12-11 PROCEDURE — G2211 COMPLEX E/M VISIT ADD ON: HCPCS | Mod: S$GLB,,, | Performed by: DERMATOLOGY

## 2024-12-11 PROCEDURE — 4010F ACE/ARB THERAPY RXD/TAKEN: CPT | Mod: CPTII,S$GLB,, | Performed by: DERMATOLOGY

## 2024-12-11 RX ORDER — RISANKIZUMAB-RZAA 150 MG/ML
INJECTION SUBCUTANEOUS
Qty: 2 ML | Refills: 1 | Status: SHIPPED | OUTPATIENT
Start: 2024-12-11

## 2024-12-11 RX ORDER — RISANKIZUMAB-RZAA 150 MG/ML
INJECTION SUBCUTANEOUS
Qty: 1 ML | Refills: 1 | Status: SHIPPED | OUTPATIENT
Start: 2024-12-11

## 2024-12-11 NOTE — PROGRESS NOTES
Patient Information  Name: Oleksandr Dawkins  : 1970  MRN: [id number]     Referring Physician:  No ref. provider found   Primary Care Physician:  Stephanie, Primary Doctor   Date of Visit: 24      Subjective:     History of Present lllness:    Oleksandr Dawkins is a 54 y.o. male who presents with a chief complaint of psoriasis.  Location: elbows, knees, post scalp, trunk (not flared today)  Signs/Symptoms: he has one active spot of psoriasis on his right elbow  Symptom course: improving, stable and chronic  Current treatment: Taltz- last injected on 11/15. Insurance sent letter stating that his med will need to change as they will no longer cover Taltz.  He has also takes CBD for joint pain which has helped.     Patient was last seen: 2024.  Prior notes by myself reviewed.   Clinical documentation obtained by nursing staff reviewed.    Review of Systems   Constitutional:  Negative for weight loss and weight gain.   HENT:  Negative for mouth sores.    Respiratory:  Negative for shortness of breath.    Gastrointestinal:  Negative for nausea, vomiting, abdominal pain and diarrhea.   Musculoskeletal:  Negative for arthralgias.   Skin:  Positive for rash (minimal). Negative for itching.   Psychiatric/Behavioral:  Negative for depressed mood.        Objective:   Physical Exam   Constitutional: He appears well-developed and well-nourished. No distress.   Neurological: He is alert and oriented to person, place, and time. He is not disoriented.   Psychiatric: He has a normal mood and affect.   Skin:   Areas Examined (abnormalities noted in diagram):   NANYE Elida MEADOWS Inspection Performed          (Unable to document PE using diagram due to issues with Epic)    Diagram Legend     Erythematous scaling macule/papule c/w actinic keratosis       Vascular papule c/w angioma      Pigmented verrucoid papule/plaque c/w seborrheic keratosis      Yellow umbilicated papule c/w sebaceous hyperplasia      Irregularly shaped tan  macule c/w lentigo     1-2 mm smooth white papules consistent with Milia      Movable subcutaneous cyst with punctum c/w epidermal inclusion cyst      Subcutaneous movable cyst c/w pilar cyst      Firm pink to brown papule c/w dermatofibroma      Pedunculated fleshy papule(s) c/w skin tag(s)      Evenly pigmented macule c/w junctional nevus     Mildly variegated pigmented, slightly irregular-bordered macule c/w mildly atypical nevus      Flesh colored to evenly pigmented papule c/w intradermal nevus       Pink pearly papule/plaque c/w basal cell carcinoma      Erythematous hyperkeratotic cursted plaque c/w SCC      Surgical scar with no sign of skin cancer recurrence      Open and closed comedones      Inflammatory papules and pustules      Verrucoid papule consistent consistent with wart     Erythematous eczematous patches and plaques     Dystrophic onycholytic nail with subungual debris c/w onychomycosis     Umbilicated papule    Erythematous-base heme-crusted tan verrucoid plaque consistent with inflamed seborrheic keratosis     Erythematous Silvery Scaling Plaque c/w Psoriasis     See annotation    No images are attached to the encounter or orders placed in the encounter.      [] Data reviewed  [] Prior external notes reviewed  [] Independent review of test  [] Management discussed with another provider  [] Independent historian    Assessment / Plan:        Psoriasis vulgaris  - stable and chronic  Psoriatic arthritis  - stable and chronic  Long-term use of high-risk medication  Currently well controlled on Taltz (Previous BSA 25%, now down to <1%); however, pt received a letter from his insurance company stating that Taltz is no longer preferred.  Will change to Skyrizi which is listed as a preferred med.  Discussed benefits and risks of Skyrizi, including but not limited to injection site reactions, allergic reaction, headache, fatigue, increased risk of infection, and decreased tumor surveillance. Patient  informed to discontinue Skyrizi and notify our office if an infection develops.  Patient counseled to avoid live vaccines.    Will start Skyrizi at 150 mg SQ on week 0, week 4, then [id number] thereafter.    Will need CBC, CMP q 3 - 6 months. Will need PPD or Quantiferon gold annually.    -     risankizumab-rzaa (SKYRIZI) 150 mg/mL PnIj; Inject 150 mg SC at week 0, week 4, and then [id number].  Dispense: 2 mL; Refill: 1  -     risankizumab-rzaa (SKYRIZI) 150 mg/mL PnIj; Inject 150 mg SC d19mpnso.  Dispense: 1 mL; Refill: 1      Follow up in about 4 months (around 4/11/2025).      Marce Falcon MD, FAAD  [facility]

## 2024-12-26 ENCOUNTER — PATIENT MESSAGE (OUTPATIENT)
Dept: DERMATOLOGY | Facility: CLINIC | Age: 54
End: 2024-12-26
Payer: COMMERCIAL

## 2025-01-16 ENCOUNTER — OFFICE VISIT (OUTPATIENT)
Dept: CARDIOLOGY | Facility: CLINIC | Age: 55
End: 2025-01-16
Payer: COMMERCIAL

## 2025-01-16 DIAGNOSIS — I50.42 CHRONIC COMBINED SYSTOLIC AND DIASTOLIC HEART FAILURE: ICD-10-CM

## 2025-01-16 DIAGNOSIS — I25.2 OLD MYOCARDIAL INFARCTION: ICD-10-CM

## 2025-01-16 DIAGNOSIS — Z90.3 S/P GASTRIC SLEEVE PROCEDURE: ICD-10-CM

## 2025-01-16 DIAGNOSIS — E78.2 MIXED HYPERLIPIDEMIA: ICD-10-CM

## 2025-01-16 DIAGNOSIS — E66.01 MORBID OBESITY: ICD-10-CM

## 2025-01-16 DIAGNOSIS — I25.10 CORONARY ARTERY DISEASE INVOLVING NATIVE CORONARY ARTERY OF NATIVE HEART WITHOUT ANGINA PECTORIS: Primary | ICD-10-CM

## 2025-01-16 DIAGNOSIS — I25.5 ISCHEMIC CARDIOMYOPATHY: ICD-10-CM

## 2025-01-16 LAB
OHS QRS DURATION: 106 MS
OHS QTC CALCULATION: 397 MS

## 2025-01-16 PROCEDURE — 93010 ELECTROCARDIOGRAM REPORT: CPT | Mod: S$GLB,,, | Performed by: INTERNAL MEDICINE

## 2025-01-16 PROCEDURE — 1159F MED LIST DOCD IN RCRD: CPT | Mod: CPTII,S$GLB,, | Performed by: INTERNAL MEDICINE

## 2025-01-16 PROCEDURE — 99999 PR PBB SHADOW E&M-EST. PATIENT-LVL III: CPT | Mod: PBBFAC,,, | Performed by: INTERNAL MEDICINE

## 2025-01-16 PROCEDURE — 99204 OFFICE O/P NEW MOD 45 MIN: CPT | Mod: 25,S$GLB,, | Performed by: INTERNAL MEDICINE

## 2025-01-16 PROCEDURE — 93005 ELECTROCARDIOGRAM TRACING: CPT

## 2025-01-16 NOTE — PROGRESS NOTES
Subjective:   01/16/2025     Patient ID:  Oleksandr Dawkins is a 54 y.o. male who presents for evaulation of Annual Exam and Establish Care       Very pleasant male presents to establish care. In 2007 he was working as a  and after particularly long work day developed chest pain, diaphoresis and nausea.  Was brought to ER and diagnosed with Acute MI. Stent was placed within 20 minutes of arrival. He capps snot recall the artery, but by ECG it appears that he had inferior wall MI. echocardiography in 3/24 shows inferior wall motion abnormalities with an ejection fraction 45%.  He has been on Entresto, spironolactone added last year, does not appear to be taking.     IN 2012 patient weight was over 300 lbs. In 2012 he had gastric sleeve surgery and lost >100 lbs(was 385). Slowly his weight increased.  After losing and gaining few pounds over the past 7 years today he weighs 225 lbs. Patient used to bicycle, but now is able to ride less off.  He is asymptomatic.      He has history of psoriasis and takes       Hypercholesterolemia is present, in 3/24, total cholesterol 130, HDL 32, LDL 70 and triglycerides 140.  On rosuvastatin 20.            Prior plan:  We have discussed adding Aldactone and titration of his medications for ICMP.  Patient would rather postpone it till he is at  home for good. (beginning of March).  He is also interested if his LVEF has improved while on Entresto.  Will schedule CFD and start Aldactone in March.  Will consider Jardiance afterwards.  Repeat lipids and LFT's in 3 months. If LDL-C not <70 will increase Crestor to 40 mh daily.    Problem List Items Addressed This Visit      Coronary artery disease involving native coronary artery without angina pectoris - Primary     Old myocardial infarction     Ischemic cardiomyopathy     Chronic combined systolic and diastolic heart failure        Other     Dyslipidemia     Morbid obesity     S/P gastric sleeve procedure     Metabolic syndrome      Psoriasis          Echocardiogram from 2024:  · Left Ventricle: The left ventricle is normal in size. Normal wall thickness. The inferior and inferolateral walls are hypokinetic to akinetic. See diagram for wall motion for details. There is mildly reduced systolic function. Ejection fraction by visual approximation is 45%. There is normal diastolic function.  · Right Ventricle: Normal right ventricular cavity size. Wall thickness is normal. Right ventricle wall motion is normal. Systolic function is normal.  · Aortic Valve: There is mild aortic valve sclerosis.  · Pulmonary Artery: The estimated pulmonary artery systolic pressure is 20 mmHg.  · IVC/SVC: Normal venous pressure at 3 mmHg.     This result contains additional information. Click to view the full report.  Notes recorded by Janet Mascorro MD on 3/5/2024 at 11:00 AM CST  Please inform the patient that his LV function remains midly depressed. I would recommend Aldactone 25mg daily and repeat BMP in 1 week.            Past Medical History:   Diagnosis Date    Arthritis 11/2000    Was told it was due to psoriasis    Hx of vasectomy 2007    Joint pain 11/2000    Skin disease 11/1984    Psoriasis as young teen       Review of patient's allergies indicates:   Allergen Reactions    Penicillins          Current Outpatient Medications:     aspirin (ECOTRIN) 81 MG EC tablet, Take 81 mg by mouth once daily., Disp: , Rfl:     ENTRESTO 24-26 mg per tablet, TAKE 1 TABLET BY MOUTH TWICE A DAY, Disp: 60 tablet, Rfl: 5    risankizumab-rzaa (SKYRIZI) 150 mg/mL PnIj, Inject 150 mg SC at week 0, week 4, and then [id number]., Disp: 2 mL, Rfl: 1    risankizumab-rzaa (SKYRIZI) 150 mg/mL PnIj, Inject 150 mg SC q89rsooy., Disp: 1 mL, Rfl: 1    rosuvastatin (CRESTOR) 20 MG tablet, TAKE 1 TABLET BY MOUTH EVERY DAY, Disp: 90 tablet, Rfl: 2     Objective:   Review of Systems   Cardiovascular:  Negative for chest pain, claudication, cyanosis, dyspnea on exertion, irregular  heartbeat, leg swelling, near-syncope, orthopnea, palpitations, paroxysmal nocturnal dyspnea and syncope.         Vitals:    01/16/25 0930   BP: (P) 104/68   Pulse: (P) 71     Wt Readings from Last 3 Encounters:   01/16/25 (P) 109.7 kg (241 lb 13.5 oz)   09/17/24 110.2 kg (243 lb)   03/01/24 110.2 kg (243 lb)     Temp Readings from Last 3 Encounters:   09/17/24 97.9 °F (36.6 °C) (Oral)   07/04/23 98.2 °F (36.8 °C) (Oral)   02/01/20 98.4 °F (36.9 °C) (Oral)     BP Readings from Last 3 Encounters:   01/16/25 (P) 104/68   09/17/24 125/86   03/01/24 100/80     Pulse Readings from Last 3 Encounters:   01/16/25 (P) 71   09/17/24 74   03/01/24 68             Physical Exam  Vitals reviewed.   Constitutional:       General: He is not in acute distress.     Appearance: He is well-developed.   HENT:      Head: Normocephalic and atraumatic.      Nose: Nose normal.   Eyes:      Conjunctiva/sclera: Conjunctivae normal.      Pupils: Pupils are equal, round, and reactive to light.   Neck:      Vascular: No carotid bruit or JVD.   Cardiovascular:      Rate and Rhythm: Normal rate and regular rhythm.      Pulses: Normal pulses and intact distal pulses.      Heart sounds: Normal heart sounds. No murmur heard.     No friction rub. No gallop.   Pulmonary:      Effort: Pulmonary effort is normal. No respiratory distress.      Breath sounds: Normal breath sounds. No wheezing or rales.   Chest:      Chest wall: No tenderness.   Abdominal:      General: Bowel sounds are normal. There is no distension.      Palpations: Abdomen is soft.      Tenderness: There is no abdominal tenderness.   Musculoskeletal:         General: No tenderness or deformity. Normal range of motion.      Cervical back: Normal range of motion and neck supple.      Right lower leg: No edema.      Left lower leg: No edema.   Skin:     General: Skin is warm and dry.      Findings: No erythema or rash.   Neurological:      Mental Status: He is alert and oriented to person,  place, and time.      Cranial Nerves: No cranial nerve deficit.      Motor: No abnormal muscle tone.      Coordination: Coordination normal.   Psychiatric:         Behavior: Behavior normal.         Thought Content: Thought content normal.         Judgment: Judgment normal.           Lab Results   Component Value Date    CHOL 130 03/01/2024    CHOL 142 12/04/2023    CHOL 233 (H) 11/08/2022     Lab Results   Component Value Date    HDL 32 (L) 03/01/2024    HDL 36 (L) 12/04/2023    HDL 42 11/08/2022     Lab Results   Component Value Date    LDLCALC 70.0 03/01/2024    LDLCALC 77.2 12/04/2023    LDLCALC 158.4 11/08/2022     Lab Results   Component Value Date    ALT 28 03/01/2024    AST 29 03/01/2024    AST 28 12/04/2023    AST 22 04/25/2023     Lab Results   Component Value Date    CREATININE 0.9 12/04/2023    BUN 17 12/04/2023     12/04/2023    K 4.3 12/04/2023    CO2 27 12/04/2023    CO2 28 04/25/2023    CO2 26 11/08/2022    CO2 26 11/08/2022     Lab Results   Component Value Date    HGB 15.4 04/25/2023    HCT 48.8 04/25/2023    HCT 49.2 11/08/2022                         Assessment and Plan:     Coronary artery disease involving native coronary artery of native heart without angina pectoris  Comments:  Asymptomatic  Orders:  -     CBC Auto Differential; Future; Expected date: 01/23/2025    Mixed hyperlipidemia  Comments:  Lab to be checked on high-dose statin  Orders:  -     Comprehensive Metabolic Panel; Future; Expected date: 01/23/2025  -     Lipoprotein A (LPA); Future; Expected date: 01/23/2025  -     Lipid Panel; Future; Expected date: 01/23/2025  -     Apolipoprotein B; Future; Expected date: 01/17/2025    Ischemic cardiomyopathy  Comments:  Echo to be repeated  Orders:  -     Echo; Future; Expected date: 01/23/2025    Chronic combined systolic and diastolic heart failure  Comments:  On tolerated dose guideline directed medical therapy    Old myocardial infarction    S/P gastric sleeve  procedure    Morbid obesity         Follow up in about 1 year (around 1/16/2026).          Future Appointments   Date Time Provider Department Center   1/29/2025  7:15 AM [facility] [place]   4/9/2025  9:00 AM Marce Falcon MD [facility]

## 2025-01-29 ENCOUNTER — HOSPITAL ENCOUNTER (OUTPATIENT)
Dept: CARDIOLOGY | Facility: HOSPITAL | Age: 55
Discharge: HOME | End: 2025-01-29
Attending: INTERNAL MEDICINE
Payer: COMMERCIAL

## 2025-01-29 VITALS
SYSTOLIC BLOOD PRESSURE: 110 MMHG | WEIGHT: 241 LBS | DIASTOLIC BLOOD PRESSURE: 74 MMHG | HEIGHT: 67 IN | BODY MASS INDEX: 37.83 KG/M2 | HEART RATE: 71 BPM

## 2025-01-29 DIAGNOSIS — I25.5 ISCHEMIC CARDIOMYOPATHY: ICD-10-CM

## 2025-01-29 LAB
ASCENDING AORTA: 2.66 CM
AV AREA BY CONTINUOUS VTI: 2.6 CM2
AV INDEX (PROSTH): 0.63
AV LVOT MEAN GRADIENT: 1 MMHG
AV LVOT PEAK GRADIENT: 3 MMHG
AV MEAN GRADIENT: 4 MMHG
AV PEAK GRADIENT: 8 MMHG
AV VALVE AREA BY VELOCITY RATIO: 2.7 CM²
AV VALVE AREA: 2.6 CM2
AV VELOCITY RATIO: 0.64
BSA FOR ECHO PROCEDURE: 2.27 M2
CV ECHO LV RWT: 0.47 CM
DOP CALC AO PEAK VEL: 1.4 M/S
DOP CALC AO VTI: 29.6 CM
DOP CALC LVOT AREA: 4.2 CM2
DOP CALC LVOT DIAMETER: 2.3 CM
DOP CALC LVOT PEAK VEL: 0.9 M/S
DOP CALC LVOT STROKE VOLUME: 77.7 CM3
DOP CALC RVOT AREA: 4.15 CM2
DOP CALC RVOT DIAMETER: 2.3 CM
DOP CALCLVOT PEAK VEL VTI: 18.7 CM
E WAVE DECELERATION TIME: 217 MS
E/A RATIO: 1.39
E/E' RATIO: 8 M/S
ECHO EF ESTIMATED: 34 %
ECHO LV POSTERIOR WALL: 1.1 CM (ref 0.6–1.1)
FRACTIONAL SHORTENING: 17 % (ref 28–44)
HR MV ECHO: 71 BPM
INTERVENTRICULAR SEPTUM: 0.9 CM (ref 0.6–1.1)
IVC DIAMETER: 1.18 CM
IVRT: 77 MS
LA MAJOR: 5.1 CM
LA MINOR: 5.1 CM
LA WIDTH: 3.9 CM
LEFT ATRIUM SIZE: 3.3 CM
LEFT ATRIUM VOLUME INDEX MOD: 21 ML/M2
LEFT ATRIUM VOLUME INDEX: 25 ML/M2
LEFT ATRIUM VOLUME MOD: 47 ML
LEFT ATRIUM VOLUME: 56 CM3
LEFT INTERNAL DIMENSION IN SYSTOLE: 3.9 CM (ref 2.1–4)
LEFT VENTRICLE DIASTOLIC VOLUME INDEX: 46.45 ML/M2
LEFT VENTRICLE DIASTOLIC VOLUME: 101.72 ML
LEFT VENTRICLE MASS INDEX: 75.1 G/M2
LEFT VENTRICLE SYSTOLIC VOLUME INDEX: 30.4 ML/M2
LEFT VENTRICLE SYSTOLIC VOLUME: 66.68 ML
LEFT VENTRICULAR INTERNAL DIMENSION IN DIASTOLE: 4.7 CM (ref 3.5–6)
LEFT VENTRICULAR MASS: 164.5 G
LV LATERAL E/E' RATIO: 6.8
LV SEPTAL E/E' RATIO: 9.4
MV A" WAVE DURATION": 106.57 MS
MV PEAK A VEL: 0.54 M/S
MV PEAK E VEL: 0.75 M/S
OHS CV RV/LV RATIO: 0.74 CM
PISA TR MAX VEL: 1.9 M/S
PULM VEIN A" WAVE DURATION": 106.57 MS
PULM VEIN S/D RATIO: 1.07
PULMONIC VEIN PEAK A VELOCITY: 0.2 M/S
PV PEAK D VEL: 0.41 M/S
PV PEAK S VEL: 0.44 M/S
RA MAJOR: 4.26 CM
RA PRESSURE ESTIMATED: 3 MMHG
RA WIDTH: 3.85 CM
RIGHT ATRIAL AREA: 14.2 CM2
RIGHT VENTRICLE DIASTOLIC BASEL DIMENSION: 3.5 CM
RV TB RVSP: 5 MMHG
RV TISSUE DOPPLER FREE WALL SYSTOLIC VELOCITY 1 (APICAL 4 CHAMBER VIEW): 11.71 CM/S
SINUS: 2.95 CM
STJ: 2.46 CM
TDI LATERAL: 0.11 M/S
TDI SEPTAL: 0.08 M/S
TDI: 0.1 M/S
TR MAX PG: 14 MMHG
TRICUSPID ANNULAR PLANE SYSTOLIC EXCURSION: 1.86 CM
TV PEAK GRADIENT: 14 MMHG
TV REST PULMONARY ARTERY PRESSURE: 17 MMHG
Z-SCORE OF LEFT VENTRICULAR DIMENSION IN END DIASTOLE: -4.54
Z-SCORE OF LEFT VENTRICULAR DIMENSION IN END SYSTOLE: -1.13

## 2025-01-29 PROCEDURE — 93306 TTE W/DOPPLER COMPLETE: CPT

## 2025-01-29 PROCEDURE — 93306 TTE W/DOPPLER COMPLETE: CPT | Mod: 26,,, | Performed by: INTERNAL MEDICINE

## 2025-02-03 DIAGNOSIS — E78.2 MIXED HYPERLIPIDEMIA: Primary | ICD-10-CM

## 2025-02-03 RX ORDER — EZETIMIBE 10 MG/1
10 TABLET ORAL DAILY
Qty: 90 TABLET | Refills: 3 | Status: SHIPPED | OUTPATIENT
Start: 2025-02-03 | End: 2026-02-03

## 2025-04-09 ENCOUNTER — OFFICE VISIT (OUTPATIENT)
Dept: DERMATOLOGY | Facility: CLINIC | Age: 55
End: 2025-04-09
Payer: COMMERCIAL

## 2025-04-09 DIAGNOSIS — L40.0 PSORIASIS VULGARIS: Primary | ICD-10-CM

## 2025-04-09 DIAGNOSIS — L40.50 PSORIATIC ARTHRITIS: ICD-10-CM

## 2025-04-09 DIAGNOSIS — Z79.899 LONG-TERM USE OF HIGH-RISK MEDICATION: ICD-10-CM

## 2025-04-09 PROCEDURE — 1159F MED LIST DOCD IN RCRD: CPT | Mod: CPTII,S$GLB,, | Performed by: DERMATOLOGY

## 2025-04-09 PROCEDURE — 4010F ACE/ARB THERAPY RXD/TAKEN: CPT | Mod: CPTII,S$GLB,, | Performed by: DERMATOLOGY

## 2025-04-09 PROCEDURE — 1160F RVW MEDS BY RX/DR IN RCRD: CPT | Mod: CPTII,S$GLB,, | Performed by: DERMATOLOGY

## 2025-04-09 PROCEDURE — 99214 OFFICE O/P EST MOD 30 MIN: CPT | Mod: S$GLB,,, | Performed by: DERMATOLOGY

## 2025-04-09 PROCEDURE — G2211 COMPLEX E/M VISIT ADD ON: HCPCS | Mod: S$GLB,,, | Performed by: DERMATOLOGY

## 2025-04-09 NOTE — PROGRESS NOTES
"  Patient Information  Name: Oleksandr Dawkins  : 1970  MRN: [id number]     Referring Physician:  No ref. provider found   Primary Care Physician:  Stephanie, Primary Doctor   Date of Visit: 25      Subjective:     History of Present lllness:    Oleksandr Dawkins is a 54 y.o. male who presents with a chief complaint of psoriasis.    Location: R elbow today  Signs/Symptoms: scaling, not itchy  Symptom course: improving of skin and states joint pain is a little better as well   Current treatment: skyrizi, 3rd injection will be in     Patient was last seen: 2024.  Prior notes by myself reviewed.   Clinical documentation obtained by nursing staff reviewed.    Review of Systems   Constitutional:  Negative for fever, fatigue and malaise.   Respiratory:  Negative for cough and shortness of breath.    Gastrointestinal:  Negative for nausea, vomiting and diarrhea.   Musculoskeletal:  Positive for arthralgias ("less achy"; no pain in hands in a.m. anymore). Negative for joint swelling.   Skin:  Positive for rash. Negative for itching and abscesses.       Objective:   Physical Exam   Constitutional: He appears well-developed and well-nourished. No distress.   Neurological: He is alert and oriented to person, place, and time. He is not disoriented.   Psychiatric: He has a normal mood and affect.   Skin:   Areas Examined (abnormalities noted in diagram):   Head / Face Inspection Performed  Neck Inspection Performed  RUE Inspected  LUE Inspection Performed            Diagram Legend     Erythematous scaling macule/papule c/w actinic keratosis       Vascular papule c/w angioma      Pigmented verrucoid papule/plaque c/w seborrheic keratosis      Yellow umbilicated papule c/w sebaceous hyperplasia      Irregularly shaped tan macule c/w lentigo     1-2 mm smooth white papules consistent with Milia      Movable subcutaneous cyst with punctum c/w epidermal inclusion cyst      Subcutaneous movable cyst c/w pilar cyst      Firm pink " to brown papule c/w dermatofibroma      Pedunculated fleshy papule(s) c/w skin tag(s)      Evenly pigmented macule c/w junctional nevus     Mildly variegated pigmented, slightly irregular-bordered macule c/w mildly atypical nevus      Flesh colored to evenly pigmented papule c/w intradermal nevus       Pink pearly papule/plaque c/w basal cell carcinoma      Erythematous hyperkeratotic cursted plaque c/w SCC      Surgical scar with no sign of skin cancer recurrence      Open and closed comedones      Inflammatory papules and pustules      Verrucoid papule consistent consistent with wart     Erythematous eczematous patches and plaques     Dystrophic onycholytic nail with subungual debris c/w onychomycosis     Umbilicated papule    Erythematous-base heme-crusted tan verrucoid plaque consistent with inflamed seborrheic keratosis     Erythematous Silvery Scaling Plaque c/w Psoriasis     See annotation    Lab Results   Component Value Date    TBGOLDPLUS Negative 04/11/2024       No images are attached to the encounter or orders placed in the encounter.      [] Data reviewed  [] Prior external notes reviewed  [] Independent review of test  [] Management discussed with another provider  [] Independent historian    Assessment / Plan:        Psoriasis vulgaris  - stable and chronic  Psoriatic arthritis  - stable and chronic  Long-term use of high-risk medication  Continue Rx Skyrizi.    Check labs:  -     CBC Auto Differential; Future; Expected date: 04/09/2025  -     Comprehensive Metabolic Panel; Future; Expected date: 04/09/2025  -     Quantiferon Gold TB; Future; Expected date: 04/09/2025      Follow up in about 6 months (around 10/9/2025).      Marce Falcon MD, FAAD  [facility]

## 2025-04-14 DIAGNOSIS — L40.50 PSORIATIC ARTHRITIS: ICD-10-CM

## 2025-04-14 DIAGNOSIS — Z79.899 LONG-TERM USE OF HIGH-RISK MEDICATION: ICD-10-CM

## 2025-04-14 DIAGNOSIS — L40.0 PSORIASIS VULGARIS: ICD-10-CM

## 2025-04-15 RX ORDER — RISANKIZUMAB-RZAA 150 MG/ML
INJECTION SUBCUTANEOUS
Qty: 1 ML | Refills: 0 | Status: SHIPPED | OUTPATIENT
Start: 2025-04-15

## 2025-04-20 DIAGNOSIS — Z90.3 S/P GASTRIC SLEEVE PROCEDURE: ICD-10-CM

## 2025-04-20 DIAGNOSIS — E88.810 METABOLIC SYNDROME: ICD-10-CM

## 2025-04-20 DIAGNOSIS — L40.9 PSORIASIS: ICD-10-CM

## 2025-04-20 DIAGNOSIS — E78.5 DYSLIPIDEMIA: ICD-10-CM

## 2025-04-20 DIAGNOSIS — E66.01 MORBID OBESITY: ICD-10-CM

## 2025-04-20 NOTE — TELEPHONE ENCOUNTER
Refill Routing Note   Medication(s) are not appropriate for processing by [facility] for the following reason(s):        Patient not seen by provider within 15 months    [facility] action(s):  Defer             Appointments  past 12m or future 3m with PCP    Date Provider   Last Visit   1/17/2024 Janet Mascorro MD   Next Visit   Visit date not found Janet Mascorro MD   ED visits in past 90 days: 0        Note composed:11:24 AM 04/20/2025

## 2025-04-22 RX ORDER — ROSUVASTATIN CALCIUM 20 MG/1
20 TABLET, COATED ORAL
Qty: 90 TABLET | Refills: 3 | Status: SHIPPED | OUTPATIENT
Start: 2025-04-22

## 2025-05-05 ENCOUNTER — LAB VISIT (OUTPATIENT)
Dept: LAB | Facility: HOSPITAL | Age: 55
End: 2025-05-05
Attending: INTERNAL MEDICINE
Payer: COMMERCIAL

## 2025-05-05 DIAGNOSIS — E78.2 MIXED HYPERLIPIDEMIA: ICD-10-CM

## 2025-05-05 LAB
ALBUMIN SERPL BCP-MCNC: 3.8 G/DL (ref 3.5–5.2)
ALP SERPL-CCNC: 70 UNIT/L (ref 40–150)
ALT SERPL W/O P-5'-P-CCNC: 22 UNIT/L (ref 10–44)
ANION GAP (OHS): 9 MMOL/L (ref 8–16)
AST SERPL-CCNC: 21 UNIT/L (ref 11–45)
BILIRUB SERPL-MCNC: 0.8 MG/DL (ref 0.1–1)
BUN SERPL-MCNC: 12 MG/DL (ref 6–20)
CALCIUM SERPL-MCNC: 9.2 MG/DL (ref 8.7–10.5)
CHLORIDE SERPL-SCNC: 102 MMOL/L (ref 95–110)
CHOLEST SERPL-MCNC: 161 MG/DL (ref 120–199)
CHOLEST/HDLC SERPL: 3.9 {RATIO} (ref 2–5)
CO2 SERPL-SCNC: 29 MMOL/L (ref 23–29)
CREAT SERPL-MCNC: 0.8 MG/DL (ref 0.5–1.4)
GFR SERPLBLD CREATININE-BSD FMLA CKD-EPI: >60 ML/MIN/1.73/M2
GLUCOSE SERPL-MCNC: 90 MG/DL (ref 70–110)
HDLC SERPL-MCNC: 41 MG/DL (ref 40–75)
HDLC SERPL: 25.5 % (ref 20–50)
LDLC SERPL CALC-MCNC: 94 MG/DL (ref 63–159)
NONHDLC SERPL-MCNC: 120 MG/DL
POTASSIUM SERPL-SCNC: 4.3 MMOL/L (ref 3.5–5.1)
PROT SERPL-MCNC: 6.8 GM/DL (ref 6–8.4)
SODIUM SERPL-SCNC: 140 MMOL/L (ref 136–145)
TRIGL SERPL-MCNC: 130 MG/DL (ref 30–150)

## 2025-05-05 PROCEDURE — 84450 TRANSFERASE (AST) (SGOT): CPT

## 2025-05-05 PROCEDURE — 82172 ASSAY OF APOLIPOPROTEIN: CPT

## 2025-05-05 PROCEDURE — 80061 LIPID PANEL: CPT

## 2025-05-05 PROCEDURE — 36415 COLL VENOUS BLD VENIPUNCTURE: CPT

## 2025-05-07 ENCOUNTER — RESULTS FOLLOW-UP (OUTPATIENT)
Dept: CARDIOLOGY | Facility: CLINIC | Age: 55
End: 2025-05-07

## 2025-05-07 LAB — APO B SERPL-MCNC: 84 MG/DL

## 2025-07-16 ENCOUNTER — OFFICE VISIT (OUTPATIENT)
Dept: PODIATRY | Facility: CLINIC | Age: 55
End: 2025-07-16
Payer: COMMERCIAL

## 2025-07-16 ENCOUNTER — PATIENT MESSAGE (OUTPATIENT)
Dept: DERMATOLOGY | Facility: CLINIC | Age: 55
End: 2025-07-16
Payer: COMMERCIAL

## 2025-07-16 VITALS — BODY MASS INDEX: 37.82 KG/M2 | HEIGHT: 67 IN | WEIGHT: 240.94 LBS

## 2025-07-16 DIAGNOSIS — B35.3 TINEA PEDIS OF BOTH FEET: Primary | ICD-10-CM

## 2025-07-16 PROCEDURE — 3008F BODY MASS INDEX DOCD: CPT | Mod: CPTII,S$GLB,, | Performed by: PODIATRIST

## 2025-07-16 PROCEDURE — 99204 OFFICE O/P NEW MOD 45 MIN: CPT | Mod: S$GLB,,, | Performed by: PODIATRIST

## 2025-07-16 PROCEDURE — 1159F MED LIST DOCD IN RCRD: CPT | Mod: CPTII,S$GLB,, | Performed by: PODIATRIST

## 2025-07-16 PROCEDURE — 4010F ACE/ARB THERAPY RXD/TAKEN: CPT | Mod: CPTII,S$GLB,, | Performed by: PODIATRIST

## 2025-07-16 PROCEDURE — 1160F RVW MEDS BY RX/DR IN RCRD: CPT | Mod: CPTII,S$GLB,, | Performed by: PODIATRIST

## 2025-07-16 PROCEDURE — 99999 PR PBB SHADOW E&M-EST. PATIENT-LVL III: CPT | Mod: PBBFAC,,, | Performed by: PODIATRIST

## 2025-07-16 RX ORDER — CICLOPIROX OLAMINE 7.7 MG/G
CREAM TOPICAL 2 TIMES DAILY
Qty: 90 G | Refills: 6 | Status: SHIPPED | OUTPATIENT
Start: 2025-07-16

## 2025-07-16 NOTE — PROGRESS NOTES
Subjective:      Patient ID: Oleksandr Dawkins is a 54 y.o. male.    Chief Complaint: Plantar Warts (Left foot )    Tense itching dry flaking skin in the bottoms of the feet right and left.  Gradual onset, worsening over the past week or 2 or 3.  Aggravated by no particular thing.  Previous oral therapy with antifungal has not improve condition substantially.  Denies trauma and surgery both feet    Review of Systems   Constitutional: Negative for chills, diaphoresis, fever, malaise/fatigue and night sweats.   Cardiovascular:  Negative for claudication, cyanosis, leg swelling and syncope.   Skin:  Positive for itching, rash and suspicious lesions. Negative for color change, dry skin, nail changes and unusual hair distribution.   Musculoskeletal:  Negative for falls, joint pain, joint swelling, muscle cramps, muscle weakness and stiffness.   Gastrointestinal:  Negative for constipation, diarrhea, nausea and vomiting.   Neurological:  Negative for brief paralysis, disturbances in coordination, focal weakness, numbness, paresthesias, sensory change and tremors.           Objective:      Physical Exam  Constitutional:       General: He is not in acute distress.     Appearance: He is well-developed. He is not diaphoretic.   Cardiovascular:      Pulses:           Popliteal pulses are 2+ on the right side and 2+ on the left side.        Dorsalis pedis pulses are 2+ on the right side and 2+ on the left side.        Posterior tibial pulses are 2+ on the right side and 2+ on the left side.      Comments: Capillary refill 3 seconds all toes/distal feet, all toes/both feet warm to touch.      Negative lymphadenopathy bilateral popliteal fossa and tarsal tunnel.      Negavie lower extremity edema bilateral.    Musculoskeletal:      Right ankle: No swelling, deformity, ecchymosis or lacerations. Normal range of motion. Normal pulse.      Right Achilles Tendon: Normal. No defects. White's test negative.   Lymphadenopathy:       Lower Body: No right inguinal adenopathy. No left inguinal adenopathy.      Comments: Negative lymphadenopathy bilateral popliteal fossa and tarsal tunnel.    Negative lymphangitic streaking bilateral feet/ankles/legs.   Skin:     General: Skin is warm and dry.      Capillary Refill: Capillary refill takes 2 to 3 seconds.      Coloration: Skin is not pale.      Findings: No abrasion, bruising, burn, ecchymosis, erythema, laceration, lesion or rash.      Nails: There is no clubbing.      Comments: Tense vesicles with scottie fluid in honeycomb light base on do annalee of these tiny bulla of the coalesce into plaques in the bottom of the right and left feet.  Both skin in the feet is without open skin drainage pus tracking fluctuance malodor signs of infection.     Neurological:      Mental Status: He is alert and oriented to person, place, and time.      Sensory: No sensory deficit.      Motor: No tremor, atrophy or abnormal muscle tone.      Gait: Gait normal.      Deep Tendon Reflexes:      Reflex Scores:       Patellar reflexes are 2+ on the right side and 2+ on the left side.       Achilles reflexes are 2+ on the right side and 2+ on the left side.  Psychiatric:         Behavior: Behavior is cooperative.           Assessment:       Encounter Diagnosis   Name Primary?    Tinea pedis of both feet Yes         Plan:       Oleksandr was seen today for plantar warts.    Diagnoses and all orders for this visit:    Tinea pedis of both feet    Other orders  -     ciclopirox (LOPROX) 0.77 % Crea; Apply topically 2 (two) times daily.      I counseled the patient on his conditions, their implications and medical management.        Topical Loprox cream twice daily 6 weeks.      Dry well after hygiene.jose m.      Consider natural fiber socks changed mid day.      Es    Discussed conservative treatment with shoes of adequate dimensions, material, and style to alleviate symptoms and delay or prevent surgical intervention.              No follow-ups on file.

## 2025-07-25 ENCOUNTER — OFFICE VISIT (OUTPATIENT)
Dept: DERMATOLOGY | Facility: CLINIC | Age: 55
End: 2025-07-25
Payer: COMMERCIAL

## 2025-07-25 DIAGNOSIS — L23.7 ALLERGIC CONTACT DERMATITIS DUE TO PLANTS, EXCEPT FOOD: Primary | ICD-10-CM

## 2025-07-25 RX ORDER — CLOBETASOL PROPIONATE 0.5 MG/G
CREAM TOPICAL 2 TIMES DAILY
Qty: 60 G | Refills: 2 | Status: SHIPPED | OUTPATIENT
Start: 2025-07-25

## 2025-07-25 RX ORDER — TRIAMCINOLONE ACETONIDE 40 MG/ML
80 INJECTION, SUSPENSION INTRA-ARTICULAR; INTRAMUSCULAR
Status: COMPLETED | OUTPATIENT
Start: 2025-07-25 | End: 2025-07-25

## 2025-07-25 RX ADMIN — TRIAMCINOLONE ACETONIDE 80 MG: 40 INJECTION, SUSPENSION INTRA-ARTICULAR; INTRAMUSCULAR at 01:07

## 2025-07-25 NOTE — PROGRESS NOTES
Patient Information  Name: Oleksandr Dawkins  : 1970  MRN: [id number]     Referring Physician:  No ref. provider found   Primary Care Physician:  No, Primary Doctor   Date of Visit: 25      Subjective:     History of Present lllness:    Oleksandr Dawkins is a 54 y.o. male who presents with a chief complaint of spot.  Location: both hands and both feet  Duration: 2 weeks  Signs/Symptoms: blisters, painful, spreading, super itchy. Stopped wearing custom orthotics since Saturday due to pain when walking.   Exacerbating factors: was working in his yard in [employer], may have contacted poison ivy (has gotten poison ivy in this yard in the past)  Relieving factors/Prior treatments: Ciclopirox cream not helping    Patient was last seen: 2025.  Prior notes by myself reviewed.   Clinical documentation obtained by nursing staff reviewed.    Review of Systems    Objective:   Physical Exam   Constitutional: He appears well-developed and well-nourished. No distress.   Neurological: He is alert and oriented to person, place, and time. He is not disoriented.   Psychiatric: He has a normal mood and affect.   Skin:   Areas Examined (abnormalities noted in diagram):   RUE Inspected  LUE Inspection Performed  RLE Inspected  LLE Inspection Performed  Nails and Digits Inspection Performed               Diagram Legend     Erythematous scaling macule/papule c/w actinic keratosis       Vascular papule c/w angioma      Pigmented verrucoid papule/plaque c/w seborrheic keratosis      Yellow umbilicated papule c/w sebaceous hyperplasia      Irregularly shaped tan macule c/w lentigo     1-2 mm smooth white papules consistent with Milia      Movable subcutaneous cyst with punctum c/w epidermal inclusion cyst      Subcutaneous movable cyst c/w pilar cyst      Firm pink to brown papule c/w dermatofibroma      Pedunculated fleshy papule(s) c/w skin tag(s)      Evenly pigmented macule c/w junctional nevus     Mildly variegated pigmented,  slightly irregular-bordered macule c/w mildly atypical nevus      Flesh colored to evenly pigmented papule c/w intradermal nevus       Pink pearly papule/plaque c/w basal cell carcinoma      Erythematous hyperkeratotic cursted plaque c/w SCC      Surgical scar with no sign of skin cancer recurrence      Open and closed comedones      Inflammatory papules and pustules      Verrucoid papule consistent consistent with wart     Erythematous eczematous patches and plaques     Dystrophic onycholytic nail with subungual debris c/w onychomycosis     Umbilicated papule    Erythematous-base heme-crusted tan verrucoid plaque consistent with inflamed seborrheic keratosis     Erythematous Silvery Scaling Plaque c/w Psoriasis     See annotation    No images are attached to the encounter or orders placed in the encounter.      [] Data reviewed  [] Prior external notes reviewed  [] Independent review of test  [] Management discussed with another provider  [] Independent historian    Assessment / Plan:        Allergic contact dermatitis due to plants, suspected  -     triamcinolone acetonide injection 80 mg IM x 1 today  -     clobetasoL (TEMOVATE) 0.05 % cream; Apply topically 2 (two) times daily. To hands and feet  Dispense: 60 g; Refill: 2      Follow up if symptoms worsen or fail to improve.      Marce Falcon MD, FAAD  [facility]

## 2025-08-11 ENCOUNTER — OFFICE VISIT (OUTPATIENT)
Dept: PRIMARY CARE CLINIC | Facility: CLINIC | Age: 55
End: 2025-08-11
Payer: COMMERCIAL

## 2025-08-11 VITALS
RESPIRATION RATE: 17 BRPM | OXYGEN SATURATION: 97 % | HEART RATE: 61 BPM | WEIGHT: 249.31 LBS | SYSTOLIC BLOOD PRESSURE: 112 MMHG | BODY MASS INDEX: 39.13 KG/M2 | HEIGHT: 67 IN | DIASTOLIC BLOOD PRESSURE: 70 MMHG

## 2025-08-11 DIAGNOSIS — I50.42 CHRONIC COMBINED SYSTOLIC AND DIASTOLIC HEART FAILURE: ICD-10-CM

## 2025-08-11 DIAGNOSIS — E78.2 MIXED HYPERLIPIDEMIA: ICD-10-CM

## 2025-08-11 DIAGNOSIS — I25.2 OLD MYOCARDIAL INFARCTION: ICD-10-CM

## 2025-08-11 DIAGNOSIS — Z00.00 ENCOUNTER FOR MEDICAL EXAMINATION TO ESTABLISH CARE: Primary | ICD-10-CM

## 2025-08-11 DIAGNOSIS — L40.9 PSORIASIS: ICD-10-CM

## 2025-08-11 DIAGNOSIS — I25.10 CORONARY ARTERY DISEASE INVOLVING NATIVE CORONARY ARTERY OF NATIVE HEART WITHOUT ANGINA PECTORIS: ICD-10-CM

## 2025-08-11 DIAGNOSIS — Z82.49 FAMILY HISTORY OF CARDIAC DISORDER: ICD-10-CM

## 2025-08-11 DIAGNOSIS — E88.810 METABOLIC SYNDROME: ICD-10-CM

## 2025-08-11 DIAGNOSIS — E66.01 MORBID OBESITY: ICD-10-CM

## 2025-08-11 DIAGNOSIS — L40.50 PSORIATIC ARTHRITIS: ICD-10-CM

## 2025-08-11 DIAGNOSIS — I25.5 ISCHEMIC CARDIOMYOPATHY: ICD-10-CM

## 2025-08-11 DIAGNOSIS — Z12.5 SCREENING FOR PROSTATE CANCER: ICD-10-CM

## 2025-08-11 DIAGNOSIS — Z90.3 S/P GASTRIC SLEEVE PROCEDURE: ICD-10-CM

## 2025-08-11 DIAGNOSIS — Z12.11 SCREEN FOR COLON CANCER: ICD-10-CM

## 2025-08-11 PROCEDURE — 4010F ACE/ARB THERAPY RXD/TAKEN: CPT | Mod: CPTII,S$GLB,, | Performed by: NURSE PRACTITIONER

## 2025-08-11 PROCEDURE — 1159F MED LIST DOCD IN RCRD: CPT | Mod: CPTII,S$GLB,, | Performed by: NURSE PRACTITIONER

## 2025-08-11 PROCEDURE — 3008F BODY MASS INDEX DOCD: CPT | Mod: CPTII,S$GLB,, | Performed by: NURSE PRACTITIONER

## 2025-08-11 PROCEDURE — 3074F SYST BP LT 130 MM HG: CPT | Mod: CPTII,S$GLB,, | Performed by: NURSE PRACTITIONER

## 2025-08-11 PROCEDURE — 3078F DIAST BP <80 MM HG: CPT | Mod: CPTII,S$GLB,, | Performed by: NURSE PRACTITIONER

## 2025-08-11 PROCEDURE — 99386 PREV VISIT NEW AGE 40-64: CPT | Mod: S$GLB,,, | Performed by: NURSE PRACTITIONER

## 2025-08-11 PROCEDURE — 99999 PR PBB SHADOW E&M-EST. PATIENT-LVL IV: CPT | Mod: PBBFAC,,, | Performed by: NURSE PRACTITIONER

## 2025-08-12 ENCOUNTER — CLINICAL SUPPORT (OUTPATIENT)
Dept: ENDOSCOPY | Facility: HOSPITAL | Age: 55
End: 2025-08-12
Payer: COMMERCIAL

## 2025-08-12 ENCOUNTER — TELEPHONE (OUTPATIENT)
Dept: ENDOSCOPY | Facility: HOSPITAL | Age: 55
End: 2025-08-12

## 2025-08-12 VITALS — BODY MASS INDEX: 39.08 KG/M2 | WEIGHT: 249 LBS | HEIGHT: 67 IN

## 2025-08-12 DIAGNOSIS — Z12.11 SCREEN FOR COLON CANCER: ICD-10-CM

## 2025-08-12 DIAGNOSIS — Z12.11 ENCOUNTER FOR SCREENING COLONOSCOPY: Primary | ICD-10-CM

## 2025-08-12 RX ORDER — SODIUM, POTASSIUM,MAG SULFATES 17.5-3.13G
1 SOLUTION, RECONSTITUTED, ORAL ORAL DAILY
Qty: 1 KIT | Refills: 0 | Status: SHIPPED | OUTPATIENT
Start: 2025-08-12 | End: 2025-08-14

## 2025-08-18 DIAGNOSIS — L40.50 PSORIATIC ARTHRITIS: ICD-10-CM

## 2025-08-18 DIAGNOSIS — Z79.899 LONG-TERM USE OF HIGH-RISK MEDICATION: ICD-10-CM

## 2025-08-18 DIAGNOSIS — L40.0 PSORIASIS VULGARIS: ICD-10-CM

## 2025-08-18 RX ORDER — RISANKIZUMAB-RZAA 150 MG/ML
INJECTION SUBCUTANEOUS
Qty: 1 ML | Refills: 0 | Status: SHIPPED | OUTPATIENT
Start: 2025-08-18

## 2025-08-20 ENCOUNTER — PATIENT MESSAGE (OUTPATIENT)
Dept: DERMATOLOGY | Facility: CLINIC | Age: 55
End: 2025-08-20
Payer: COMMERCIAL